# Patient Record
Sex: FEMALE | Race: WHITE | NOT HISPANIC OR LATINO | Employment: FULL TIME | URBAN - METROPOLITAN AREA
[De-identification: names, ages, dates, MRNs, and addresses within clinical notes are randomized per-mention and may not be internally consistent; named-entity substitution may affect disease eponyms.]

---

## 2017-01-18 ENCOUNTER — TRANSFERRED RECORDS (OUTPATIENT)
Dept: HEALTH INFORMATION MANAGEMENT | Facility: CLINIC | Age: 52
End: 2017-01-18

## 2017-01-18 ENCOUNTER — EXTERNAL ORDER RESULTS (OUTPATIENT)
Dept: HEALTH INFORMATION MANAGEMENT | Facility: CLINIC | Age: 52
End: 2017-01-18

## 2017-01-18 LAB
HPV ABSTRACT: NORMAL
PAP SMEAR - HIM PATIENT REPORTED: NEGATIVE

## 2017-01-30 ENCOUNTER — TRANSFERRED RECORDS (OUTPATIENT)
Dept: HEALTH INFORMATION MANAGEMENT | Facility: CLINIC | Age: 52
End: 2017-01-30

## 2017-02-08 ENCOUNTER — TRANSFERRED RECORDS (OUTPATIENT)
Dept: HEALTH INFORMATION MANAGEMENT | Facility: CLINIC | Age: 52
End: 2017-02-08

## 2017-04-18 ENCOUNTER — APPOINTMENT (OUTPATIENT)
Dept: GENERAL RADIOLOGY | Facility: CLINIC | Age: 52
DRG: 287 | End: 2017-04-18
Attending: EMERGENCY MEDICINE
Payer: COMMERCIAL

## 2017-04-18 ENCOUNTER — HOSPITAL ENCOUNTER (INPATIENT)
Facility: CLINIC | Age: 52
LOS: 1 days | Discharge: HOME | DRG: 287 | End: 2017-04-19
Attending: EMERGENCY MEDICINE | Admitting: INTERNAL MEDICINE
Payer: COMMERCIAL

## 2017-04-18 ENCOUNTER — TELEPHONE (OUTPATIENT)
Dept: PEDIATRICS | Facility: CLINIC | Age: 52
End: 2017-04-18

## 2017-04-18 DIAGNOSIS — I20.0 UNSTABLE ANGINA (H): ICD-10-CM

## 2017-04-18 DIAGNOSIS — R07.9 ACUTE CHEST PAIN: ICD-10-CM

## 2017-04-18 PROBLEM — I24.9 ACS (ACUTE CORONARY SYNDROME) (H): Status: ACTIVE | Noted: 2017-04-18

## 2017-04-18 LAB
ALBUMIN SERPL-MCNC: 3.7 G/DL (ref 3.4–5)
ALP SERPL-CCNC: 89 U/L (ref 40–150)
ALT SERPL W P-5'-P-CCNC: 29 U/L (ref 0–50)
ANION GAP SERPL CALCULATED.3IONS-SCNC: 8 MMOL/L (ref 3–14)
AST SERPL W P-5'-P-CCNC: 17 U/L (ref 0–45)
BASOPHILS # BLD AUTO: 0 10E9/L (ref 0–0.2)
BASOPHILS NFR BLD AUTO: 0.5 %
BILIRUB SERPL-MCNC: 0.4 MG/DL (ref 0.2–1.3)
BUN SERPL-MCNC: 11 MG/DL (ref 7–30)
CALCIUM SERPL-MCNC: 8.8 MG/DL (ref 8.5–10.1)
CHLORIDE SERPL-SCNC: 107 MMOL/L (ref 94–109)
CO2 SERPL-SCNC: 24 MMOL/L (ref 20–32)
CREAT SERPL-MCNC: 0.74 MG/DL (ref 0.52–1.04)
D DIMER PPP FEU-MCNC: 0.3 UG/ML FEU (ref 0–0.5)
DIFFERENTIAL METHOD BLD: NORMAL
DIFFERENTIAL METHOD BLD: NORMAL
EOSINOPHIL # BLD AUTO: 0.1 10E9/L (ref 0–0.7)
EOSINOPHIL NFR BLD AUTO: 1.3 %
ERYTHROCYTE [DISTWIDTH] IN BLOOD BY AUTOMATED COUNT: 13.1 % (ref 10–15)
ERYTHROCYTE [DISTWIDTH] IN BLOOD BY AUTOMATED COUNT: NORMAL % (ref 10–15)
GFR SERPL CREATININE-BSD FRML MDRD: 82 ML/MIN/1.7M2
GLUCOSE SERPL-MCNC: 95 MG/DL (ref 70–99)
HCT VFR BLD AUTO: 40.2 % (ref 35–47)
HCT VFR BLD AUTO: NORMAL % (ref 35–47)
HGB BLD-MCNC: 14 G/DL (ref 11.7–15.7)
HGB BLD-MCNC: NORMAL G/DL (ref 11.7–15.7)
IMM GRANULOCYTES # BLD: 0 10E9/L (ref 0–0.4)
IMM GRANULOCYTES NFR BLD: 0.1 %
INTERPRETATION ECG - MUSE: NORMAL
LMWH PPP CHRO-ACNC: 0.6 IU/ML
LYMPHOCYTES # BLD AUTO: 1.7 10E9/L (ref 0.8–5.3)
LYMPHOCYTES NFR BLD AUTO: 22.2 %
MCH RBC QN AUTO: 32 PG (ref 26.5–33)
MCH RBC QN AUTO: NORMAL PG (ref 26.5–33)
MCHC RBC AUTO-ENTMCNC: 34.8 G/DL (ref 31.5–36.5)
MCHC RBC AUTO-ENTMCNC: NORMAL G/DL (ref 31.5–36.5)
MCV RBC AUTO: 92 FL (ref 78–100)
MCV RBC AUTO: NORMAL FL (ref 78–100)
MONOCYTES # BLD AUTO: 0.6 10E9/L (ref 0–1.3)
MONOCYTES NFR BLD AUTO: 8.1 %
NEUTROPHILS # BLD AUTO: 5.2 10E9/L (ref 1.6–8.3)
NEUTROPHILS NFR BLD AUTO: 67.8 %
NRBC # BLD AUTO: 0 10*3/UL
NRBC BLD AUTO-RTO: 0 /100
PLATELET # BLD AUTO: 282 10E9/L (ref 150–450)
PLATELET # BLD AUTO: NORMAL 10E9/L (ref 150–450)
POTASSIUM SERPL-SCNC: 4 MMOL/L (ref 3.4–5.3)
PROT SERPL-MCNC: 8 G/DL (ref 6.8–8.8)
RBC # BLD AUTO: 4.38 10E12/L (ref 3.8–5.2)
RBC # BLD AUTO: NORMAL 10E12/L (ref 3.8–5.2)
SODIUM SERPL-SCNC: 139 MMOL/L (ref 133–144)
TROPONIN I SERPL-MCNC: NORMAL UG/L (ref 0–0.04)
WBC # BLD AUTO: 7.7 10E9/L (ref 4–11)
WBC # BLD AUTO: NORMAL 10E9/L (ref 4–11)

## 2017-04-18 PROCEDURE — 71020 XR CHEST 2 VW: CPT

## 2017-04-18 PROCEDURE — 36415 COLL VENOUS BLD VENIPUNCTURE: CPT | Performed by: PHYSICIAN ASSISTANT

## 2017-04-18 PROCEDURE — 36415 COLL VENOUS BLD VENIPUNCTURE: CPT | Performed by: EMERGENCY MEDICINE

## 2017-04-18 PROCEDURE — 84484 ASSAY OF TROPONIN QUANT: CPT | Performed by: PHYSICIAN ASSISTANT

## 2017-04-18 PROCEDURE — 93005 ELECTROCARDIOGRAM TRACING: CPT

## 2017-04-18 PROCEDURE — 25000132 ZZH RX MED GY IP 250 OP 250 PS 637: Performed by: PHYSICIAN ASSISTANT

## 2017-04-18 PROCEDURE — 99221 1ST HOSP IP/OBS SF/LOW 40: CPT | Performed by: INTERNAL MEDICINE

## 2017-04-18 PROCEDURE — 25000128 H RX IP 250 OP 636: Performed by: EMERGENCY MEDICINE

## 2017-04-18 PROCEDURE — 99223 1ST HOSP IP/OBS HIGH 75: CPT | Mod: 25 | Performed by: INTERNAL MEDICINE

## 2017-04-18 PROCEDURE — 36415 COLL VENOUS BLD VENIPUNCTURE: CPT

## 2017-04-18 PROCEDURE — 99207 ZZC CDG-EXAM COMPONENT: MEETS EXP PROBLEM FOCUSED - DOWN CODED: CPT | Performed by: INTERNAL MEDICINE

## 2017-04-18 PROCEDURE — 96365 THER/PROPH/DIAG IV INF INIT: CPT

## 2017-04-18 PROCEDURE — 85379 FIBRIN DEGRADATION QUANT: CPT | Performed by: EMERGENCY MEDICINE

## 2017-04-18 PROCEDURE — 25000132 ZZH RX MED GY IP 250 OP 250 PS 637: Performed by: EMERGENCY MEDICINE

## 2017-04-18 PROCEDURE — 25000132 ZZH RX MED GY IP 250 OP 250 PS 637

## 2017-04-18 PROCEDURE — 85520 HEPARIN ASSAY: CPT

## 2017-04-18 PROCEDURE — 85025 COMPLETE CBC W/AUTO DIFF WBC: CPT | Performed by: EMERGENCY MEDICINE

## 2017-04-18 PROCEDURE — 21000001 ZZH R&B HEART CARE

## 2017-04-18 PROCEDURE — 99207 ZZC APP CREDIT; MD BILLING SHARED VISIT: CPT | Performed by: PHYSICIAN ASSISTANT

## 2017-04-18 PROCEDURE — 99285 EMERGENCY DEPT VISIT HI MDM: CPT | Mod: 25

## 2017-04-18 RX ORDER — ONDANSETRON 4 MG/1
4 TABLET, ORALLY DISINTEGRATING ORAL EVERY 6 HOURS PRN
Status: DISCONTINUED | OUTPATIENT
Start: 2017-04-18 | End: 2017-04-19 | Stop reason: HOSPADM

## 2017-04-18 RX ORDER — ONDANSETRON 2 MG/ML
4 INJECTION INTRAMUSCULAR; INTRAVENOUS EVERY 6 HOURS PRN
Status: DISCONTINUED | OUTPATIENT
Start: 2017-04-18 | End: 2017-04-19 | Stop reason: HOSPADM

## 2017-04-18 RX ORDER — NITROGLYCERIN 80 MG/1
1 PATCH TRANSDERMAL ONCE
Status: COMPLETED | OUTPATIENT
Start: 2017-04-18 | End: 2017-04-18

## 2017-04-18 RX ORDER — SIMVASTATIN 20 MG
20 TABLET ORAL EVERY EVENING
Status: DISCONTINUED | OUTPATIENT
Start: 2017-04-18 | End: 2017-04-19

## 2017-04-18 RX ORDER — ACETAMINOPHEN 325 MG/1
650 TABLET ORAL EVERY 4 HOURS PRN
Status: DISCONTINUED | OUTPATIENT
Start: 2017-04-18 | End: 2017-04-19

## 2017-04-18 RX ORDER — HEPARIN SODIUM 1000 [USP'U]/ML
6000 INJECTION, SOLUTION INTRAVENOUS; SUBCUTANEOUS ONCE
Status: COMPLETED | OUTPATIENT
Start: 2017-04-18 | End: 2017-04-18

## 2017-04-18 RX ORDER — LIDOCAINE 40 MG/G
CREAM TOPICAL
Status: DISCONTINUED | OUTPATIENT
Start: 2017-04-18 | End: 2017-04-19

## 2017-04-18 RX ORDER — MORPHINE SULFATE 2 MG/ML
1 INJECTION, SOLUTION INTRAMUSCULAR; INTRAVENOUS
Status: DISCONTINUED | OUTPATIENT
Start: 2017-04-18 | End: 2017-04-19 | Stop reason: HOSPADM

## 2017-04-18 RX ORDER — ASPIRIN 325 MG
325 TABLET ORAL DAILY
Status: DISCONTINUED | OUTPATIENT
Start: 2017-04-19 | End: 2017-04-19

## 2017-04-18 RX ORDER — ALUMINA, MAGNESIA, AND SIMETHICONE 2400; 2400; 240 MG/30ML; MG/30ML; MG/30ML
15-30 SUSPENSION ORAL EVERY 4 HOURS PRN
Status: DISCONTINUED | OUTPATIENT
Start: 2017-04-18 | End: 2017-04-19 | Stop reason: HOSPADM

## 2017-04-18 RX ORDER — LIDOCAINE 40 MG/G
CREAM TOPICAL
Status: CANCELLED | OUTPATIENT
Start: 2017-04-18

## 2017-04-18 RX ORDER — NITROGLYCERIN 0.4 MG/1
0.4 TABLET SUBLINGUAL EVERY 5 MIN PRN
Status: DISCONTINUED | OUTPATIENT
Start: 2017-04-18 | End: 2017-04-18

## 2017-04-18 RX ORDER — NITROGLYCERIN 0.4 MG/1
TABLET SUBLINGUAL
Status: COMPLETED
Start: 2017-04-18 | End: 2017-04-18

## 2017-04-18 RX ORDER — NITROGLYCERIN 0.4 MG/1
0.4 TABLET SUBLINGUAL EVERY 5 MIN PRN
Status: DISCONTINUED | OUTPATIENT
Start: 2017-04-18 | End: 2017-04-19 | Stop reason: HOSPADM

## 2017-04-18 RX ORDER — PROCHLORPERAZINE MALEATE 5 MG
5-10 TABLET ORAL EVERY 6 HOURS PRN
Status: DISCONTINUED | OUTPATIENT
Start: 2017-04-18 | End: 2017-04-19 | Stop reason: HOSPADM

## 2017-04-18 RX ORDER — PROCHLORPERAZINE 25 MG
25 SUPPOSITORY, RECTAL RECTAL EVERY 12 HOURS PRN
Status: DISCONTINUED | OUTPATIENT
Start: 2017-04-18 | End: 2017-04-19 | Stop reason: HOSPADM

## 2017-04-18 RX ORDER — LIDOCAINE 40 MG/G
CREAM TOPICAL
Status: DISCONTINUED | OUTPATIENT
Start: 2017-04-18 | End: 2017-04-18

## 2017-04-18 RX ORDER — ACETAMINOPHEN 650 MG/1
650 SUPPOSITORY RECTAL EVERY 4 HOURS PRN
Status: DISCONTINUED | OUTPATIENT
Start: 2017-04-18 | End: 2017-04-19 | Stop reason: HOSPADM

## 2017-04-18 RX ORDER — NALOXONE HYDROCHLORIDE 0.4 MG/ML
.1-.4 INJECTION, SOLUTION INTRAMUSCULAR; INTRAVENOUS; SUBCUTANEOUS
Status: DISCONTINUED | OUTPATIENT
Start: 2017-04-18 | End: 2017-04-19

## 2017-04-18 RX ADMIN — METOPROLOL TARTRATE 12.5 MG: 25 TABLET, FILM COATED ORAL at 20:24

## 2017-04-18 RX ADMIN — METOPROLOL TARTRATE 12.5 MG: 25 TABLET, FILM COATED ORAL at 15:01

## 2017-04-18 RX ADMIN — Medication 6000 UNITS: at 11:46

## 2017-04-18 RX ADMIN — NITROGLYCERIN 1 PATCH: 0.4 PATCH TRANSDERMAL at 11:58

## 2017-04-18 RX ADMIN — SIMVASTATIN 20 MG: 20 TABLET, FILM COATED ORAL at 20:24

## 2017-04-18 RX ADMIN — NITROGLYCERIN 0.4 MG: 0.4 TABLET SUBLINGUAL at 10:44

## 2017-04-18 RX ADMIN — ACETAMINOPHEN 650 MG: 325 TABLET, FILM COATED ORAL at 15:01

## 2017-04-18 RX ADMIN — HEPARIN SODIUM 1200 UNITS/HR: 10000 INJECTION, SOLUTION INTRAVENOUS at 11:46

## 2017-04-18 RX ADMIN — NITROGLYCERIN 0.4 MG: 0.4 TABLET SUBLINGUAL at 10:49

## 2017-04-18 ASSESSMENT — ENCOUNTER SYMPTOMS
VOMITING: 0
DIAPHORESIS: 0
NAUSEA: 0
APPETITE CHANGE: 0
SHORTNESS OF BREATH: 0
FEVER: 0
LIGHT-HEADEDNESS: 1
COUGH: 0
NUMBNESS: 1

## 2017-04-18 ASSESSMENT — ACTIVITIES OF DAILY LIVING (ADL)
FALL_HISTORY_WITHIN_LAST_SIX_MONTHS: NO
RETIRED_EATING: 0-->INDEPENDENT
COGNITION: 0 - NO COGNITION ISSUES REPORTED
TOILETING: 0-->INDEPENDENT
DRESS: 0-->INDEPENDENT
AMBULATION: 0-->INDEPENDENT
SWALLOWING: 0-->SWALLOWS FOODS/LIQUIDS WITHOUT DIFFICULTY
RETIRED_COMMUNICATION: 0-->UNDERSTANDS/COMMUNICATES WITHOUT DIFFICULTY
BATHING: 0-->INDEPENDENT
TRANSFERRING: 0-->INDEPENDENT

## 2017-04-18 NOTE — H&P
PRIMARY CARE PROVIDER:  Claudette Estrada MD      CHIEF COMPLAINT:  Chest pain.  History obtained from patient.      DATE OF VISIT:  2017      HISTORY OF PRESENT ILLNESS:  Dalila Roman is a 51-year-old female with past medical history of morbid obesity who presented to the Emergency Department today with complaints of chest pain.  She was in her normal state of health this morning when she was getting in the shower and she developed substernal chest pain that she describes as a heaviness or as though something was sitting on her chest.  She was able to proceed with her morning and went to work.  Her chest pain started radiating to her left arm and eventually to her jaw.  When it radiated to her jaw, she elected to be evaluated by the school nurse.  She was given aspirin and EMS was summoned.  She was subsequently given 2 nitroglycerin by EMS and transferred to [facility].      In the Emergency Department, she was evaluated by Dr. Patrick.  Vitals, she was hypertensive on arrival but remainder were unremarkable.  Laboratory evaluation was unremarkable.  EKG without nonischemic.  She was given 2 additional nitroglycerin and initiated on nitroglycerin patch as well as heparin drip due to concern for unstable angina.      Presently patient is evaluated in her hospital room.  She offers no complaints at this time.  Chest pain has currently resolved.  No previous history of chest pain.  She does have a history of morbid obesity, but no other cardiovascular risk factors.  She does have substantial family history of coronary artery disease.  Her father  of a fatal MI in his 40s and both brothers were diagnosed with coronary artery disease in their 40s as well.      SOCIAL HISTORY:  She is fairly active.  She goes to the gym 3 times per week and denies any recent issues with chest pain, dyspnea on exertion or decreased exercise tolerance.      PAST MEDICAL HISTORY:   1.  Morbid obesity, BMI  43.4.   2.  Seasonal allergies.      PRIOR TO ADMISSION MEDICATIONS:  Zyrtec p.r.n.      ALLERGIES:  Codeine.      SURGICAL HISTORY:   x4.      FAMILY HISTORY:  Father  of a fatal MI at the age of 42.  He did have comorbid conditions including obesity and tobacco dependence.  Two brothers have also been diagnosed with coronary artery disease in their 40s.      SOCIAL HISTORY:  She is a lifelong nonsmoker, drinks alcohol socially, is .      REVIEW OF SYSTEMS:  A 10-point review of systems was completed.  Pertinent positives noted in HPI, all other systems negative.      PHYSICAL EXAMINATION:   GENERAL:  Dalila Roman is an extremely pleasant, well-developed, well-nourished 51-year-old female who is lying in bed in no acute distress.   VITAL SIGNS:  Blood pressure is 123/68, heart rate 62, respiration 20, O2 sat 99% on room air.   HEENT:  Normocephalic and atraumatic.   CARDIOVASCULAR:  Regular rate and rhythm, no murmurs.   PULMONARY:  Normal effort.  Clear to auscultation bilaterally.   ABDOMEN:  Normal bowel sounds.  Abdomen is soft, nontender.   EXTREMITIES:  Moves all 4 extremities.  Dorsalis pedis radial pulses are palpated bilaterally.   NEUROLOGIC:  Alert and oriented.  Cranial nerves II-XII are grossly intact.      EKG:  Personally reviewed EKG agree with no ischemic changes.      IMAGING:  Personally reviewed chest x-ray and agree with no acute cardiopulmonary disease.      ASSESSMENT:  Dalila Roman is a 51-year-old female with past medical history of morbid obesity and family history of premature coronary artery disease who presented to the Emergency Department with complaints of chest pain and is currently being admitted for further evaluation.   1.  Chest pain with concerns of unstable angina.  Workup in the Emergency Department was essentially negative; however, she does have a very convincing story.  She has been initiated on heparin drip and nitroglycerin patch in the Emergency  Department and will continue.  We will also start low dose beta blocker and statin as well as daily aspirin.  Will trend troponins and monitoring on continuous telemetry.  We will consult Cardiology for further recommendations.  Fasting lipid profile will be obtained in the morning.  We will defer further cardiac imaging or workup to Cardiology's recommendations.   2.  Morbid obesity, BMI 43.4.   3.  Deep venous thrombosis prophylaxis.  Heparin drip.   4.  Code status:  Full code.      The patient was discussed with Dr. Sofia Nance of the Hospitalist Service who independently interviewed and examined the patient.  He is in agreement with the above plan.         SOFIA NANCE MD       As dictated by MARIBEL SOTO PA-C            D: 2017 12:39   T: 2017 13:20   MT: GREGORY      Name:     JUDITH RASCON   MRN:      [id number]        Account:      [id number]   :      1965           Admitted:     [id number]      Document: [id number]       cc: Claudette Estrada MD

## 2017-04-18 NOTE — IP AVS SNAPSHOT
MRN:[id number]                      After Visit Summary   4/18/2017    Dalila Roman    MRN: [id number]           Thank you!     Thank you for choosing [facility] for your care. Our goal is always to provide you with excellent care. Hearing back from our patients is one way we can continue to improve our services. Please take a few minutes to complete the written survey that you may receive in the mail after you visit with us. Thank you!        Patient Information     Date Of Birth          1965        Designated Caregiver       Most Recent Value    Caregiver    Will someone help with your care after discharge? no      About your hospital stay     You were admitted on:  April 18, 2017 You last received care in the:  [facility]    You were discharged on:  April 19, 2017        Reason for your hospital stay       You were admitted with chest pain. Chest x-ray was normal. A d-dimer test that looks for blood clots was negative. We initially treated you like a heart attack but luckily the angiogram showed your heart arteries were all open and functioning well so this pain is not thought to be from your heart.  It is most likely some reflux or muscular pain.  If it recurs you should follow up with your primary care physician as needed to continue to help you. It was a pleasure for our Hospitalist group and me personally to care for you at [facility].  We wish you a speedy recovery and good health!                  Who to Call     For medical emergencies, please call [phone].  For non-urgent questions about your medical care, please call your primary care provider or clinic, [phone]          Attending Provider     Provider Specialty    Zac Patrick MD Emergency Medicine    Luis Wolff MD Internal Medicine       Primary Care Provider Office Phone # Fax #    Claudette Estrada [phone]       [facility]  [address] [place] [address]        After Care Instructions     Activity       Your activity upon discharge: activity as tolerated            Diet       Follow this diet upon discharge: Regular diet                  Follow-up Appointments     Follow-up and recommended labs and tests        Follow up with primary care provider, Claudette Estrada, as needed if your pain recurs.                  Further instructions from your care team       Going Home after ANGIOGRAM      Name: Dalila Roman  Medical Record Number:  [id number]  Today's Date: April 19, 2017        For 24 hours:         Have an adult stay with you for 24 hours.         Relax and take it easy.         Drink plenty of fluids.         You may eat your normal diet, unless your doctor tells you otherwise.         Do NOT make any important or legal decisions.         Do NOT drive or operate machines at home or at work.         Do NOT drink alcohol.      Do NOT smoke.     Medicines:         If you have begun Plavix (clopidogrel), Effient (prasugrel), or Brilinta (ticagrelor), do not stop taking it until you talk to your heart doctor (cardiologist).         If you are on metformin (Glucophage), do not restart it until you have blood tests (within 2 to 3 days after discharge). When your doctor tells you it is safe, you may restart the metformin.         If you have stopped any other medicines, check with your nurse or provider about when to restart them.    Care of groin site:         Remove the Band-Aid after 24 hours. If there is minor oozing, apply another Band-aid and remove it after 12 hours.          Do NOT take a bath, or use a hot tub or pool for at least 3 days. You may shower.          It is normal to have a small bruise or lump at the site.         Do not scrub the site.         Do not use lotion or powder near the puncture site for 3 days.         For the first 2 days: Do not stoop or squat. When you cough, sneeze or move your  "bowels, hold your hand over the puncture site and press gently.         Do not lift more than 10 pounds for at least 3 to 5 days.         For 2 days, do NOT have sex or do any heavy exercise.     If you start bleeding from the site in your groin:  Lie down flat and press firmly on the site.  Call your physician immediately, or, come to the emergency room.      Call 911 right away if you have bleeding that is heavy or does not stop.     Call your doctor if:         You have a large or growing hard lump around the site.         The site is red, swollen, hot or tender.         Blood or fluid is draining from the site.         You have chills or a fever greater than 101 F (38 C).         Your leg or arm turns bluish, feels numb or cool.         You have hives, a rash or unusual itching.       ADDITIONAL INSTRUCTIONS:    [facility] [place]:   [phone] (7 days a week)      Cardiology Fellow on call (24 hours per day) at [facility]:   [phone] (ask for Cardiology Fellow on call)      Number where we can reach you:  ____________    Pending Results     No orders found for last 3 day(s).            Statement of Approval     Ordered          04/19/17 6095  I have reviewed and agree with all the recommendations and orders detailed in this document.  EFFECTIVE NOW     Approved and electronically signed by:  Luis Wolff MD             Admission Information     Date & Time Provider Department Dept. Phone    4/18/2017 Luis Wolff MD [facility] [phone]      Your Vitals Were     Blood Pressure Pulse Temperature Respirations Height Weight    155/97 (BP Location: Left arm) 79 98.6  F (37  C) (Oral) 16 1.549 m (5' 1\") 106.5 kg (234 lb 12.6 oz)    Pulse Oximetry BMI (Body Mass Index)                97% 44.36 kg/m2          MyChart Information     [facility] gives you secure access to your electronic health record. If you see a primary care " provider, you can also send messages to your care team and make appointments. If you have questions, please call your primary care clinic.  If you do not have a primary care provider, please call [phone] and they will assist you.        Care EveryWhere ID     This is your Care EveryWhere ID. This could be used by other organizations to access your [facility] medical records  [id number]           Review of your medicines      CONTINUE these medicines which have NOT CHANGED        Dose / Directions    cetirizine 10 MG tablet   Commonly known as:  zyrTEC   Used for:  Environmental allergies, Seasonal allergic rhinitis        Dose:  10 mg   Take 1 tablet (10 mg) by mouth daily   Quantity:  90 tablet   Refills:  3                Protect others around you: Learn how to safely use, store and throw away your medicines at www.disposemymeds.org.             Medication List: This is a list of all your medications and when to take them. Check marks below indicate your daily home schedule. Keep this list as a reference.      Medications           Morning Afternoon Evening Bedtime As Needed    cetirizine 10 MG tablet   Commonly known as:  zyrTEC   Take 1 tablet (10 mg) by mouth daily   Next Dose Due:  4-20-17 AM

## 2017-04-18 NOTE — TELEPHONE ENCOUNTER
Patient calling asking if she walked in could an EKG be done. Patient states she has had chest pain in the left chest area for about 2 hours. Denies SOB or sweating. States she does have some tingling going into the left arm area. Advised that she should go to the ER rather than the clinic as they can do troponin levels and we can't. She will do this.   Alessandra Johansen RN

## 2017-04-18 NOTE — CONSULTS
CARDIOLOGY CONSULTATION      REFERRING SERVICE:  Hospitalist Service.      INDICATION FOR CARDIAC CONSULTATION:  Acute coronary syndrome.      HISTORY OF PRESENT ILLNESS:  It is my pleasure to see your patient, Dalila Roman, who is a very pleasant 51-year-old female patient with no past cardiac history, but who has a very strong family history.  Her father  of a myocardial infarct in his early 40s and her brothers all have coronary artery disease in their 40s.  The patient was getting into the shower today when she developed left-sided chest pressure like somebody was sitting on her chest.  The discomfort radiated down her left arm.  She continued with her showering and went to work, but the discomfort continued.  It lasted in total for approximately an hour and a half to 2 hours.  She did take sublingual nitroglycerin, and eventually, the sublingual nitroglycerin and nitropatch relieved the chest discomfort.  Her first troponin is normal, being less than 0.015.  The 12-lead electrocardiogram which was performed when the patient was pain-free did not show acute ischemic changes.  EKG essentially was normal with a ventricular rate of 64 beats per minute.      The patient's risk factor for coronary artery disease is strong family history.  She is not diabetic.  She smoked many years ago and only lightly.  She does not have a history of hypertension.  Her cholesterol in the past was noted to be normal.      PAST MEDICAL HISTORY:  The patient is morbidly obese with a BMI of 43.46 and she has seasonal allergies.  She had 4 C-sections.      FAMILY HISTORY:  Strongly positive for coronary artery disease.      SOCIAL HISTORY:  Many years ago she smoked and only very lightly.  She drinks alcohol occasionally.  She is  and her  is here with her.      ALLERGIES:  Codeine which causes hallucinations.      MEDICATIONS IN HOSPITAL:   1.  Aspirin 325 mg per day.   2.  Intravenous heparin bolus plus infusion.   3.   Metoprolol tartrate 12.5 mg twice day.   4.  Nitropatch.   5.  Simvastatin 20 mg every evening.      REVIEW OF SYSTEMS:   CONSTITUTIONAL:  Negative.   EYES:  Negative.   ENT:  Negative.   CARDIOVASCULAR:  As above.   RESPIRATORY:  Nil.   GASTROINTESTINAL:  Nil.   GENITOURINARY:  Nil.   MUSCULOSKELETAL:  Nil.   NEUROLOGIC:  Nil.   PSYCHIATRIC:  Nil.   ENDOCRINE:  Nil.   HEMATOLYMPHATIC:  Nil.   ALLERGY/IMMUNOLOGY:  As above.      PHYSICAL EXAMINATION:   GENERAL:  She is a very pleasant patient in no apparent distress.   VITAL SIGNS:  Her blood pressure is 141/74.  Her pulse is 77 beats per minute, sinus rhythm on the monitor.  She is afebrile at 98.1.  She is satting at 97% and her respiratory rate is 18.   HEAD, EYES, EARS, NECK, NOSE AND THROAT:  Reveal that she has normal facial symmetry.  Her pupils are equal and reacting to light.  Her carotids are normal with no bruits.  Her jugular venous pulse is not raised.   CARDIOVASCULAR:  Heart sounds 1 and 2 are normal with no murmurs and no extra heart sounds.   CHEST:  Clear to percussion and auscultation with no added sounds.   ABDOMEN:  Unremarkable with no hepatosplenomegaly, no masses or bruits.   EXTREMITIES:  Femoral pulses are 1+, dorsalis pedis and posterior tibials are 2+.  She has no peripheral edema.   DERMATOLOGIC:  No skin lesions were noted.   NEUROLOGIC:  She moves all 4 limbs appropriately with no obvious sensory or motor loss and she is fully oriented to time, place and person with a normal affect.      LABORATORY INVESTIGATIONS:  Sodium is 139, potassium is 4.0, BUN is 11, creatinine is 0.74.  Liver function tests are normal.  First troponin is less than 0.015.  Random glucose is 95.  Her white cell count is 7.7, hemoglobin is 14.0 and platelet count is 282,000.  D-dimer is 0.3.      Chest x-ray was normal.      IMPRESSION:  The chest discomfort is very suggestive for angina pectoris with a pressure type sensation radiating down the left arm.  This  is also in a patient who has a very strong family history for coronary artery disease and who is morbidly obese.      PLAN:  I feel that the best way to determine the cause of this patient's chest discomfort is perform a coronary angiogram.  I explained the risks and benefits of the procedure to the patient including the risk of stroke, heart attack, death, bleeding, bruising, hematoma formation, vascular damage, dye allergy, dye nephropathy, the risks associated with conscious sedation including aspiration, intubation and the risks associated with blood transfusions.  I also explained the special risks associated with coronary interventions including increased risk of death, myocardial infarction and emergency bypass surgery.  The patient told me she understood why we are doing the procedure, she told me she understood the risks associated with the procedure, and finally, she told me that she wished to proceed.  We will continue her good present medical therapy and we will complete the troponin series.      It has been my pleasure to be involved in the care of this very nice patient.         CATHY MCKNIGHT MD, [facility]             D: 2017 13:56   T: 2017 14:38   MT: TS      Name:     JUDITH RASCON   MRN:      [id number]        Account:       [id number]   :      1965           Consult Date:  2017      Document: [id number]

## 2017-04-18 NOTE — PLAN OF CARE
Problem: Goal Outcome Summary  Goal: Goal Outcome Summary  Outcome: No Change  A&Ox4. 98% on RA. Tele SR HR, 70s. Pt complained of headache, treated with Tylenol with relief. Heparin running at 1200 units/hr. Plan for possible angio tomorrow. Continue to monitor.

## 2017-04-18 NOTE — ED PROVIDER NOTES
History     Chief Complaint:  Chest pain     HPI   Dalila Roman is a 51 year old otherwise healthy female who presents to the emergency department today via EMS for evaluation of chest pain. The patient reports while getting ready for this morning she had a gradual onset chest pain with associated left arm tingling and jaw pain. She went to work and developed a sharp pain up her arm when typing as well as starting to feel lightheaded. The patient works in a school district, and at this time she presented to the school nurse who gave her four baby aspirins and subsequently called EMS. In route, the patient was given two nitroglycerin's which helped her pain but it has radiated to her upper chest. She describes her discomfort as if someone is sitting on her chest and has never had pain like this before. Additionally, the patient exercises, but normally has no discomfort. The patient denies appetite change, shortness of breath, diaphoresis, leg swelling or soreness, nausea, and vomiting.     Cardiac/PE/DVT Risk Factors:  History of hypertension - Negative  History of hyperlipidemia - Negative  History of diabetes - Negative  History of smoking - Positive  Personal history of PE/DVT - Negative  Family history of PE/DVT - Negative  Family history of heart complications - Positive  Exercise - Positive  Recent travel - Negative  Recent surgery - Negative  Other immobilizations - Negative  Cancer - Negative    Allergies:  Codeine sulfate     Medications:    Zyrtec    Past Medical History:    Environmental allergies     Past Surgical History:     x2  Billings teeth extraction  Orthopedic surgery, right foot  Carpel tunnel surgery    Family History:    Diabetes  Breast cancer  Hypertension  MI  Heart Disease  Colorectal cancer  Thyroid disease    Social History:  The patient was accompanied to the ED by her family.  Smoking Status: Former  Smokeless Tobacco: Never  Alcohol Use: Occasionally   Marital Status:   "      Review of Systems   Constitutional: Negative for appetite change, diaphoresis and fever.   Respiratory: Negative for cough and shortness of breath.    Cardiovascular: Positive for chest pain. Negative for leg swelling.   Gastrointestinal: Negative for nausea and vomiting.   Musculoskeletal:        Jaw pain and superior head pain   Neurological: Positive for light-headedness and numbness (left arm).   All other systems reviewed and are negative.    Physical Exam   First Vitals:  BP (!) 140/102  Temp 98.1  F (36.7  C) (Oral)  Ht 1.549 m (5' 1\")  Wt 104.3 kg (230 lb)  SpO2 99%  BMI 43.46 kg/m2       Physical Exam  Constitutional: Heave set white female supine. No respiratory distress.   HENT: No signs of trauma.   Eyes: EOM are normal. Pupils are equal, round, and reactive to light.   Neck: Normal range of motion. No JVD present. No cervical adenopathy.  Cardiovascular: Regular rhythm.  Exam reveals no gallop and no friction rub.    No murmur heard. 2+ radial and femoral pulses.  Pulmonary/Chest: Bilateral breath sounds normal. No wheezes, rhonchi or rales.  Abdominal: Soft. No tenderness. No rebound or guarding.   Musculoskeletal: No edema. No tenderness.   Lymphadenopathy: No lymphadenopathy.   Neurological: Alert and oriented to person, place, and time. Normal strength. Coordination normal.   Skin: Skin is warm and dry. No rash noted. No erythema.     Emergency Department Course     ECG:  ECG taken at 1029  Normal sinus rhythm  Normal ECG  Rate 64 bpm. CA interval 172. QRS duration 86. QT/QTc 454/468. P-R-T axes 13 10 12.    Imaging:  Radiology findings were communicated with the patient who voiced understanding of the findings.  XR Chest 2 Views  IMPRESSION:  Negative.   Report per radiology     Laboratory:  Laboratory findings were communicated with the patient who voiced understanding of the findings.  D Dimer: 0.3  CBC: WBC 7.7, HGB 14.0,   Troponin: <0.015  CMP: AWNL (Creatinine " 0.74)    Interventions:  1044 Nitroglycerin 0.4mg Sublingual   1049 Nitroglycerin 0.4mg Sublingual   1146 Heparin infusion 1,200 units/hr IV  1146 Heparin injection 6,000 units IV  1158 Nitroglycerin 1 Patch Transdermal      Emergency Department Course:  Nursing notes and vitals reviewed.  The patient was sent for a XR Chest 2 Views while in the emergency department, results above.   IV was inserted and blood was drawn for laboratory testing, results above.  1030: I performed an exam of the patient as documented above.   1119: Patient's pain improved from a 6 to a 0 with the two nitroglycerins.   1144: Patient rechecked and updated.   1149:  I spoke with Dr. Wolff of the hospitalist service regarding patient's presentation, findings, and plan of care.  I discussed the treatment plan with the patient. They expressed understanding of this plan and consented to admission. I discussed the patient with Dr. Wolff, who will admit the patient to a monitored bed for further evaluation and treatment.  I personally reviewed the laboratory results with the Patient and answered all related questions prior to admission.     Impression & Plan      Medical Decision Making:  Dalila Roman is a 51 year old female who presents to the emergency department with chest discomfort. Patient was getting ready for her work and she noted tightness in her chest going into her jaw. She went to work and fell somewhat lightheaded. She went to the nurses' office who gave her aspirin and called an ambulance. She received two nitro in route and her pain is much better, but not completely gone. Patient is heavy set, she is a remote smoker. She has no diabetes or cholesterol problems. She does exercise three times a week, but she also had a father who  at 42 from heart disease. Her examination is non-focal. Her chest x-ray, troponin, d-dimer, and EKG are unremarkable. She received two additional nitro and pain resolved. I placed her on nitro  patch and started her on heparin, both bolus and drip with a concern being unstable angina. I think this is unlikely to be PE or dissection. Patient discussed with Dr. Wolff and will be admitted to [facility] for further evaluation and treatment.    Diagnosis:    ICD-10-CM    1. Acute chest pain R07.9    2. Unstable angina (H) I20.0      Disposition:   Admitted to [facility] under the supervision of Dr. Wolff    Scribe Disclosure:  I, Lolis Lang, am serving as a scribe at 10:30 AM on 4/18/2017 to document services personally performed by Zac Patrick MD, based on my observations and the provider's statements to me.    4/18/2017    EMERGENCY DEPARTMENT       Zac Patrick MD  04/18/17 3554

## 2017-04-18 NOTE — ED NOTES
"[facility]  ED Nurse Handoff Report    ED Chief complaint: Chest Pain (started this am at home, pain radiates into the jaw and down the left arm. )      ED Diagnosis:   Final diagnoses:   Acute chest pain       Code Status: Full Code    Allergies:   Allergies   Allergen Reactions     Codeine Sulfate Other (See Comments)     Hallucinations       Activity level - Baseline/Home:  Independent    Activity Level - Current:   Independent     Needed?: No    Isolation: No  Infection: Not Applicable    Bariatric?: No    Vital Signs:   Vitals:    04/18/17 1033 04/18/17 1040   BP: (!) 164/95 (!) 140/102   Temp:  98.1  F (36.7  C)   TempSrc:  Oral   SpO2:  99%   Weight:  104.3 kg (230 lb)   Height:  1.549 m (5' 1\")       Cardiac Rhythm: ,   Cardiac  Cardiac Rhythm: Sinus bradycardia    Pain level: 0-10 Pain Scale: 0    Is this patient confused?: No    Patient Report: Initial Complaint: This morning pt was getting ready for work and developed chest pressure that radiated into the jaw and down the left arm. At work she developed a sharp pain up her arm when typing. School nurse who gave her 4 baby ASA and called EMS.   Focused Assessment: Pt is pain free after given nitro x 2 in the ED.   Tests Performed: Chest xray, EKG, Labs  Abnormal Results: Trop WNL  Treatments provided: Heparin started. Nitro patch on right arm.    Family Comments: Spouse at bedside.     OBS brochure/video discussed/provided to patient: No    ED Medications:   Medications   lidocaine 1 % 1 mL (not administered)   lidocaine (LMX4) cream (not administered)   sodium chloride (PF) 0.9% PF flush 3 mL (not administered)   sodium chloride (PF) 0.9% PF flush 3 mL (not administered)   nitroglycerin (NITROSTAT) sublingual tablet 0.4 mg (0.4 mg Sublingual Given 4/18/17 1049)   heparin infusion 25,000 units in 0.45% NaCl 250 mL (1,200 Units/hr Intravenous New Bag 4/18/17 1146)   heparin (porcine) injection 6,000 Units (6,000 Units Intravenous " Given 4/18/17 1146)   nitroglycerin (NITRODUR) 0.4 MG/HR 24 hr patch 1 patch (1 patch Transdermal Given 4/18/17 1158)       Drips infusing?:  Yes      ED NURSE PHONE NUMBER: [phone]

## 2017-04-18 NOTE — IP AVS SNAPSHOT
[facility]    [address] [place] [address]., [address]    [place] [address]    Phone:  [phone]                                       After Visit Summary   4/18/2017    Dalila Roman    MRN: [id number]           After Visit Summary Signature Page     I have received my discharge instructions, and my questions have been answered. I have discussed any challenges I see with this plan with the nurse or doctor.    ..........................................................................................................................................  Patient/Patient Representative Signature      ..........................................................................................................................................  Patient Representative Print Name and Relationship to Patient    ..................................................               ................................................  Date                                            Time    ..........................................................................................................................................  Reviewed by Signature/Title    ...................................................              ..............................................  Date                                                            Time

## 2017-04-19 ENCOUNTER — APPOINTMENT (OUTPATIENT)
Dept: CARDIOLOGY | Facility: CLINIC | Age: 52
DRG: 287 | End: 2017-04-19
Attending: INTERNAL MEDICINE
Payer: COMMERCIAL

## 2017-04-19 VITALS
OXYGEN SATURATION: 97 % | TEMPERATURE: 98.6 F | HEIGHT: 61 IN | HEART RATE: 79 BPM | RESPIRATION RATE: 16 BRPM | WEIGHT: 234.79 LBS | BODY MASS INDEX: 44.33 KG/M2 | DIASTOLIC BLOOD PRESSURE: 97 MMHG | SYSTOLIC BLOOD PRESSURE: 155 MMHG

## 2017-04-19 LAB
CHOLEST SERPL-MCNC: 187 MG/DL
HCG UR QL: NEGATIVE
HDLC SERPL-MCNC: 70 MG/DL
KCT BLD-ACNC: 138 SEC (ref 105–167)
LDLC SERPL CALC-MCNC: 90 MG/DL
LMWH PPP CHRO-ACNC: 0.26 IU/ML
LMWH PPP CHRO-ACNC: 0.28 IU/ML
NONHDLC SERPL-MCNC: 117 MG/DL
TRIGL SERPL-MCNC: 136 MG/DL

## 2017-04-19 PROCEDURE — 99238 HOSP IP/OBS DSCHRG MGMT 30/<: CPT | Performed by: INTERNAL MEDICINE

## 2017-04-19 PROCEDURE — 25000132 ZZH RX MED GY IP 250 OP 250 PS 637: Performed by: PHYSICIAN ASSISTANT

## 2017-04-19 PROCEDURE — 93458 L HRT ARTERY/VENTRICLE ANGIO: CPT | Mod: 26 | Performed by: INTERNAL MEDICINE

## 2017-04-19 PROCEDURE — 25000128 H RX IP 250 OP 636

## 2017-04-19 PROCEDURE — 85520 HEPARIN ASSAY: CPT | Performed by: INTERNAL MEDICINE

## 2017-04-19 PROCEDURE — 27210910 ZZH NEEDLE CR6

## 2017-04-19 PROCEDURE — B2111ZZ FLUOROSCOPY OF MULTIPLE CORONARY ARTERIES USING LOW OSMOLAR CONTRAST: ICD-10-PCS | Performed by: INTERNAL MEDICINE

## 2017-04-19 PROCEDURE — 27211089 ZZH KIT ACIST INJECTOR CR3

## 2017-04-19 PROCEDURE — 25000125 ZZHC RX 250: Performed by: INTERNAL MEDICINE

## 2017-04-19 PROCEDURE — 27210795 ZZH PAD DEFIB QUICK CR4

## 2017-04-19 PROCEDURE — 81025 URINE PREGNANCY TEST: CPT | Performed by: INTERNAL MEDICINE

## 2017-04-19 PROCEDURE — 99152 MOD SED SAME PHYS/QHP 5/>YRS: CPT

## 2017-04-19 PROCEDURE — 25000128 H RX IP 250 OP 636: Performed by: INTERNAL MEDICINE

## 2017-04-19 PROCEDURE — 99231 SBSQ HOSP IP/OBS SF/LOW 25: CPT | Performed by: INTERNAL MEDICINE

## 2017-04-19 PROCEDURE — 36415 COLL VENOUS BLD VENIPUNCTURE: CPT | Performed by: PHYSICIAN ASSISTANT

## 2017-04-19 PROCEDURE — 36415 COLL VENOUS BLD VENIPUNCTURE: CPT | Performed by: INTERNAL MEDICINE

## 2017-04-19 PROCEDURE — 80061 LIPID PANEL: CPT | Performed by: PHYSICIAN ASSISTANT

## 2017-04-19 PROCEDURE — 25000132 ZZH RX MED GY IP 250 OP 250 PS 637: Performed by: INTERNAL MEDICINE

## 2017-04-19 PROCEDURE — 85347 COAGULATION TIME ACTIVATED: CPT

## 2017-04-19 PROCEDURE — 85520 HEPARIN ASSAY: CPT | Performed by: PHYSICIAN ASSISTANT

## 2017-04-19 PROCEDURE — 27210787 ZZH MANIFOLD CR2

## 2017-04-19 PROCEDURE — B2151ZZ FLUOROSCOPY OF LEFT HEART USING LOW OSMOLAR CONTRAST: ICD-10-PCS | Performed by: INTERNAL MEDICINE

## 2017-04-19 PROCEDURE — 4A023N7 MEASUREMENT OF CARDIAC SAMPLING AND PRESSURE, LEFT HEART, PERCUTANEOUS APPROACH: ICD-10-PCS | Performed by: INTERNAL MEDICINE

## 2017-04-19 PROCEDURE — 99152 MOD SED SAME PHYS/QHP 5/>YRS: CPT | Performed by: INTERNAL MEDICINE

## 2017-04-19 PROCEDURE — 93458 L HRT ARTERY/VENTRICLE ANGIO: CPT

## 2017-04-19 PROCEDURE — C1760 CLOSURE DEV, VASC: HCPCS

## 2017-04-19 PROCEDURE — 27210946 ZZH KIT HC TOTES DISP CR8

## 2017-04-19 RX ORDER — VERAPAMIL HYDROCHLORIDE 2.5 MG/ML
1-2.5 INJECTION, SOLUTION INTRAVENOUS
Status: DISCONTINUED | OUTPATIENT
Start: 2017-04-19 | End: 2017-04-19 | Stop reason: HOSPADM

## 2017-04-19 RX ORDER — LORAZEPAM 0.5 MG/1
0.5 TABLET ORAL
Status: DISCONTINUED | OUTPATIENT
Start: 2017-04-19 | End: 2017-04-19 | Stop reason: HOSPADM

## 2017-04-19 RX ORDER — DEXTROSE MONOHYDRATE 25 G/50ML
12.5-5 INJECTION, SOLUTION INTRAVENOUS EVERY 30 MIN PRN
Status: DISCONTINUED | OUTPATIENT
Start: 2017-04-19 | End: 2017-04-19 | Stop reason: HOSPADM

## 2017-04-19 RX ORDER — DOPAMINE HYDROCHLORIDE 160 MG/100ML
2-20 INJECTION, SOLUTION INTRAVENOUS CONTINUOUS PRN
Status: DISCONTINUED | OUTPATIENT
Start: 2017-04-19 | End: 2017-04-19 | Stop reason: HOSPADM

## 2017-04-19 RX ORDER — ASPIRIN 325 MG
325 TABLET ORAL
Status: DISCONTINUED | OUTPATIENT
Start: 2017-04-19 | End: 2017-04-19 | Stop reason: HOSPADM

## 2017-04-19 RX ORDER — ATROPINE SULFATE 0.1 MG/ML
0.5 INJECTION INTRAVENOUS EVERY 5 MIN PRN
Status: DISCONTINUED | OUTPATIENT
Start: 2017-04-19 | End: 2017-04-19 | Stop reason: HOSPADM

## 2017-04-19 RX ORDER — NIFEDIPINE 10 MG/1
10 CAPSULE ORAL
Status: DISCONTINUED | OUTPATIENT
Start: 2017-04-19 | End: 2017-04-19 | Stop reason: HOSPADM

## 2017-04-19 RX ORDER — ASPIRIN 81 MG/1
81-324 TABLET, CHEWABLE ORAL
Status: DISCONTINUED | OUTPATIENT
Start: 2017-04-19 | End: 2017-04-19 | Stop reason: HOSPADM

## 2017-04-19 RX ORDER — IOPAMIDOL 755 MG/ML
55 INJECTION, SOLUTION INTRAVASCULAR ONCE
Status: COMPLETED | OUTPATIENT
Start: 2017-04-19 | End: 2017-04-19

## 2017-04-19 RX ORDER — ASPIRIN 81 MG/1
81 TABLET ORAL DAILY
Status: DISCONTINUED | OUTPATIENT
Start: 2017-04-20 | End: 2017-04-19

## 2017-04-19 RX ORDER — NITROGLYCERIN 5 MG/ML
100-200 VIAL (ML) INTRAVENOUS
Status: DISCONTINUED | OUTPATIENT
Start: 2017-04-19 | End: 2017-04-19 | Stop reason: HOSPADM

## 2017-04-19 RX ORDER — POTASSIUM CHLORIDE 1500 MG/1
20 TABLET, EXTENDED RELEASE ORAL
Status: DISCONTINUED | OUTPATIENT
Start: 2017-04-19 | End: 2017-04-19 | Stop reason: HOSPADM

## 2017-04-19 RX ORDER — FUROSEMIDE 10 MG/ML
20-100 INJECTION INTRAMUSCULAR; INTRAVENOUS
Status: DISCONTINUED | OUTPATIENT
Start: 2017-04-19 | End: 2017-04-19 | Stop reason: HOSPADM

## 2017-04-19 RX ORDER — NALOXONE HYDROCHLORIDE 0.4 MG/ML
.1-.4 INJECTION, SOLUTION INTRAMUSCULAR; INTRAVENOUS; SUBCUTANEOUS
Status: DISCONTINUED | OUTPATIENT
Start: 2017-04-19 | End: 2017-04-19 | Stop reason: HOSPADM

## 2017-04-19 RX ORDER — HEPARIN SODIUM 1000 [USP'U]/ML
1000-10000 INJECTION, SOLUTION INTRAVENOUS; SUBCUTANEOUS EVERY 5 MIN PRN
Status: DISCONTINUED | OUTPATIENT
Start: 2017-04-19 | End: 2017-04-19 | Stop reason: HOSPADM

## 2017-04-19 RX ORDER — DIPHENHYDRAMINE HYDROCHLORIDE 50 MG/ML
25-50 INJECTION INTRAMUSCULAR; INTRAVENOUS
Status: DISCONTINUED | OUTPATIENT
Start: 2017-04-19 | End: 2017-04-19 | Stop reason: HOSPADM

## 2017-04-19 RX ORDER — DOBUTAMINE HYDROCHLORIDE 200 MG/100ML
2-20 INJECTION INTRAVENOUS CONTINUOUS PRN
Status: DISCONTINUED | OUTPATIENT
Start: 2017-04-19 | End: 2017-04-19 | Stop reason: HOSPADM

## 2017-04-19 RX ORDER — PROTAMINE SULFATE 10 MG/ML
1-5 INJECTION, SOLUTION INTRAVENOUS
Status: DISCONTINUED | OUTPATIENT
Start: 2017-04-19 | End: 2017-04-19 | Stop reason: HOSPADM

## 2017-04-19 RX ORDER — FENTANYL CITRATE 50 UG/ML
25-50 INJECTION, SOLUTION INTRAMUSCULAR; INTRAVENOUS
Status: DISCONTINUED | OUTPATIENT
Start: 2017-04-19 | End: 2017-04-19 | Stop reason: HOSPADM

## 2017-04-19 RX ORDER — NITROGLYCERIN 5 MG/ML
100-500 VIAL (ML) INTRAVENOUS
Status: DISCONTINUED | OUTPATIENT
Start: 2017-04-19 | End: 2017-04-19 | Stop reason: HOSPADM

## 2017-04-19 RX ORDER — NITROGLYCERIN 20 MG/100ML
.07-1.88 INJECTION INTRAVENOUS CONTINUOUS PRN
Status: DISCONTINUED | OUTPATIENT
Start: 2017-04-19 | End: 2017-04-19 | Stop reason: HOSPADM

## 2017-04-19 RX ORDER — ATROPINE SULFATE 0.1 MG/ML
.5-1 INJECTION INTRAVENOUS
Status: DISCONTINUED | OUTPATIENT
Start: 2017-04-19 | End: 2017-04-19 | Stop reason: HOSPADM

## 2017-04-19 RX ORDER — LIDOCAINE 40 MG/G
CREAM TOPICAL
Status: DISCONTINUED | OUTPATIENT
Start: 2017-04-19 | End: 2017-04-19 | Stop reason: HOSPADM

## 2017-04-19 RX ORDER — EPTIFIBATIDE 2 MG/ML
2 INJECTION, SOLUTION INTRAVENOUS CONTINUOUS PRN
Status: DISCONTINUED | OUTPATIENT
Start: 2017-04-19 | End: 2017-04-19 | Stop reason: HOSPADM

## 2017-04-19 RX ORDER — MORPHINE SULFATE 2 MG/ML
1-2 INJECTION, SOLUTION INTRAMUSCULAR; INTRAVENOUS EVERY 5 MIN PRN
Status: DISCONTINUED | OUTPATIENT
Start: 2017-04-19 | End: 2017-04-19 | Stop reason: HOSPADM

## 2017-04-19 RX ORDER — HYDRALAZINE HYDROCHLORIDE 20 MG/ML
10-20 INJECTION INTRAMUSCULAR; INTRAVENOUS
Status: DISCONTINUED | OUTPATIENT
Start: 2017-04-19 | End: 2017-04-19 | Stop reason: HOSPADM

## 2017-04-19 RX ORDER — POTASSIUM CHLORIDE 7.45 MG/ML
10 INJECTION INTRAVENOUS
Status: DISCONTINUED | OUTPATIENT
Start: 2017-04-19 | End: 2017-04-19 | Stop reason: HOSPADM

## 2017-04-19 RX ORDER — FLUMAZENIL 0.1 MG/ML
0.2 INJECTION, SOLUTION INTRAVENOUS
Status: DISCONTINUED | OUTPATIENT
Start: 2017-04-19 | End: 2017-04-19 | Stop reason: HOSPADM

## 2017-04-19 RX ORDER — POTASSIUM CHLORIDE 29.8 MG/ML
20 INJECTION INTRAVENOUS
Status: DISCONTINUED | OUTPATIENT
Start: 2017-04-19 | End: 2017-04-19 | Stop reason: HOSPADM

## 2017-04-19 RX ORDER — BUPIVACAINE HYDROCHLORIDE 2.5 MG/ML
1-10 INJECTION, SOLUTION EPIDURAL; INFILTRATION; INTRACAUDAL
Status: DISCONTINUED | OUTPATIENT
Start: 2017-04-19 | End: 2017-04-19 | Stop reason: HOSPADM

## 2017-04-19 RX ORDER — PROTAMINE SULFATE 10 MG/ML
25-100 INJECTION, SOLUTION INTRAVENOUS EVERY 5 MIN PRN
Status: DISCONTINUED | OUTPATIENT
Start: 2017-04-19 | End: 2017-04-19 | Stop reason: HOSPADM

## 2017-04-19 RX ORDER — HYDROCODONE BITARTRATE AND ACETAMINOPHEN 5; 325 MG/1; MG/1
1-2 TABLET ORAL EVERY 4 HOURS PRN
Status: DISCONTINUED | OUTPATIENT
Start: 2017-04-19 | End: 2017-04-19 | Stop reason: HOSPADM

## 2017-04-19 RX ORDER — CLOPIDOGREL BISULFATE 75 MG/1
75 TABLET ORAL
Status: DISCONTINUED | OUTPATIENT
Start: 2017-04-19 | End: 2017-04-19 | Stop reason: HOSPADM

## 2017-04-19 RX ORDER — LORAZEPAM 2 MG/ML
.5-2 INJECTION INTRAMUSCULAR EVERY 4 HOURS PRN
Status: DISCONTINUED | OUTPATIENT
Start: 2017-04-19 | End: 2017-04-19 | Stop reason: HOSPADM

## 2017-04-19 RX ORDER — NALOXONE HYDROCHLORIDE 0.4 MG/ML
.2-.4 INJECTION, SOLUTION INTRAMUSCULAR; INTRAVENOUS; SUBCUTANEOUS
Status: DISCONTINUED | OUTPATIENT
Start: 2017-04-19 | End: 2017-04-19

## 2017-04-19 RX ORDER — METHYLPREDNISOLONE SODIUM SUCCINATE 125 MG/2ML
125 INJECTION, POWDER, LYOPHILIZED, FOR SOLUTION INTRAMUSCULAR; INTRAVENOUS
Status: DISCONTINUED | OUTPATIENT
Start: 2017-04-19 | End: 2017-04-19 | Stop reason: HOSPADM

## 2017-04-19 RX ORDER — ENALAPRILAT 1.25 MG/ML
1.25-2.5 INJECTION INTRAVENOUS
Status: DISCONTINUED | OUTPATIENT
Start: 2017-04-19 | End: 2017-04-19 | Stop reason: HOSPADM

## 2017-04-19 RX ORDER — LIDOCAINE HYDROCHLORIDE 10 MG/ML
1-10 INJECTION, SOLUTION EPIDURAL; INFILTRATION; INTRACAUDAL; PERINEURAL
Status: COMPLETED | OUTPATIENT
Start: 2017-04-19 | End: 2017-04-19

## 2017-04-19 RX ORDER — CLOPIDOGREL BISULFATE 75 MG/1
300-600 TABLET ORAL
Status: DISCONTINUED | OUTPATIENT
Start: 2017-04-19 | End: 2017-04-19 | Stop reason: HOSPADM

## 2017-04-19 RX ORDER — PROMETHAZINE HYDROCHLORIDE 25 MG/ML
6.25-25 INJECTION, SOLUTION INTRAMUSCULAR; INTRAVENOUS EVERY 4 HOURS PRN
Status: DISCONTINUED | OUTPATIENT
Start: 2017-04-19 | End: 2017-04-19 | Stop reason: HOSPADM

## 2017-04-19 RX ORDER — PHENYLEPHRINE HCL IN 0.9% NACL 1 MG/10 ML
20-100 SYRINGE (ML) INTRAVENOUS
Status: DISCONTINUED | OUTPATIENT
Start: 2017-04-19 | End: 2017-04-19 | Stop reason: HOSPADM

## 2017-04-19 RX ORDER — ADENOSINE 3 MG/ML
12-12000 INJECTION, SOLUTION INTRAVENOUS
Status: DISCONTINUED | OUTPATIENT
Start: 2017-04-19 | End: 2017-04-19 | Stop reason: HOSPADM

## 2017-04-19 RX ORDER — SODIUM CHLORIDE 9 MG/ML
INJECTION, SOLUTION INTRAVENOUS CONTINUOUS
Status: DISCONTINUED | OUTPATIENT
Start: 2017-04-19 | End: 2017-04-19

## 2017-04-19 RX ORDER — SODIUM NITROPRUSSIDE 25 MG/ML
100-200 INJECTION INTRAVENOUS
Status: DISCONTINUED | OUTPATIENT
Start: 2017-04-19 | End: 2017-04-19 | Stop reason: HOSPADM

## 2017-04-19 RX ORDER — SODIUM CHLORIDE 9 MG/ML
INJECTION, SOLUTION INTRAVENOUS CONTINUOUS
Status: DISCONTINUED | OUTPATIENT
Start: 2017-04-19 | End: 2017-04-19 | Stop reason: HOSPADM

## 2017-04-19 RX ORDER — ONDANSETRON 2 MG/ML
4 INJECTION INTRAMUSCULAR; INTRAVENOUS EVERY 4 HOURS PRN
Status: DISCONTINUED | OUTPATIENT
Start: 2017-04-19 | End: 2017-04-19 | Stop reason: HOSPADM

## 2017-04-19 RX ORDER — ACETAMINOPHEN 325 MG/1
325-650 TABLET ORAL EVERY 4 HOURS PRN
Status: DISCONTINUED | OUTPATIENT
Start: 2017-04-19 | End: 2017-04-19 | Stop reason: HOSPADM

## 2017-04-19 RX ORDER — NITROGLYCERIN 0.4 MG/1
0.4 TABLET SUBLINGUAL EVERY 5 MIN PRN
Status: DISCONTINUED | OUTPATIENT
Start: 2017-04-19 | End: 2017-04-19 | Stop reason: HOSPADM

## 2017-04-19 RX ORDER — NALOXONE HYDROCHLORIDE 0.4 MG/ML
0.4 INJECTION, SOLUTION INTRAMUSCULAR; INTRAVENOUS; SUBCUTANEOUS EVERY 5 MIN PRN
Status: DISCONTINUED | OUTPATIENT
Start: 2017-04-19 | End: 2017-04-19 | Stop reason: HOSPADM

## 2017-04-19 RX ORDER — METOPROLOL TARTRATE 1 MG/ML
5 INJECTION, SOLUTION INTRAVENOUS EVERY 5 MIN PRN
Status: DISCONTINUED | OUTPATIENT
Start: 2017-04-19 | End: 2017-04-19 | Stop reason: HOSPADM

## 2017-04-19 RX ORDER — LIDOCAINE HYDROCHLORIDE 10 MG/ML
30 INJECTION, SOLUTION EPIDURAL; INFILTRATION; INTRACAUDAL; PERINEURAL
Status: DISCONTINUED | OUTPATIENT
Start: 2017-04-19 | End: 2017-04-19 | Stop reason: HOSPADM

## 2017-04-19 RX ORDER — EPTIFIBATIDE 2 MG/ML
180 INJECTION, SOLUTION INTRAVENOUS EVERY 10 MIN PRN
Status: DISCONTINUED | OUTPATIENT
Start: 2017-04-19 | End: 2017-04-19 | Stop reason: HOSPADM

## 2017-04-19 RX ORDER — NICARDIPINE HYDROCHLORIDE 2.5 MG/ML
100 INJECTION INTRAVENOUS
Status: DISCONTINUED | OUTPATIENT
Start: 2017-04-19 | End: 2017-04-19 | Stop reason: HOSPADM

## 2017-04-19 RX ORDER — LORAZEPAM 2 MG/ML
0.5 INJECTION INTRAMUSCULAR
Status: DISCONTINUED | OUTPATIENT
Start: 2017-04-19 | End: 2017-04-19 | Stop reason: HOSPADM

## 2017-04-19 RX ORDER — PRASUGREL 10 MG/1
10-60 TABLET, FILM COATED ORAL
Status: DISCONTINUED | OUTPATIENT
Start: 2017-04-19 | End: 2017-04-19 | Stop reason: HOSPADM

## 2017-04-19 RX ADMIN — ASPIRIN 325 MG: 325 TABLET, DELAYED RELEASE ORAL at 08:17

## 2017-04-19 RX ADMIN — IOPAMIDOL 55 ML: 755 INJECTION, SOLUTION INTRAVASCULAR at 09:45

## 2017-04-19 RX ADMIN — HEPARIN SODIUM 1000 UNITS/HR: 10000 INJECTION, SOLUTION INTRAVENOUS at 06:12

## 2017-04-19 RX ADMIN — ACETAMINOPHEN 650 MG: 325 TABLET, FILM COATED ORAL at 02:19

## 2017-04-19 RX ADMIN — SODIUM CHLORIDE: 9 INJECTION, SOLUTION INTRAVENOUS at 06:12

## 2017-04-19 RX ADMIN — METOPROLOL TARTRATE 12.5 MG: 25 TABLET, FILM COATED ORAL at 08:16

## 2017-04-19 RX ADMIN — MIDAZOLAM HYDROCHLORIDE 2 MG: 1 INJECTION, SOLUTION INTRAMUSCULAR; INTRAVENOUS at 09:25

## 2017-04-19 RX ADMIN — FENTANYL CITRATE 50 MCG: 50 INJECTION, SOLUTION INTRAMUSCULAR; INTRAVENOUS at 09:24

## 2017-04-19 RX ADMIN — LIDOCAINE HYDROCHLORIDE 80 MG: 10 INJECTION, SOLUTION EPIDURAL; INFILTRATION; INTRACAUDAL; PERINEURAL at 09:24

## 2017-04-19 ASSESSMENT — PAIN DESCRIPTION - DESCRIPTORS: DESCRIPTORS: NAGGING

## 2017-04-19 NOTE — PROGRESS NOTES
[facility]    Cardiology Progress Note    Date of Service: 04/19/2017     Assessment & Plan   Dalila Roman is a 51 year old female with past medical history of morbid obesity, family HX of cad, seasonal allergies.  This patient was admitted on 4/18/2017 with symptoms of chest pain radiating down left arm.    1. Chest pain suggestive of angina pectoris  trops negative-no ischemic EKG changes  IV Heparin  Asa  Metoprolol  Statins  Coronary angiogram today  Headache from nitrates    2. LDL 90-placed on Simvastatin for now  If CAD, will need high intensity statins      3. Morbid obesity  BMI 44.36            Malachi Loepz, MSN, APRN, CNP  [facility]    Interval History   Headache from nitrates--no chest pain  Review of Systems:  The Review of Systems is negative other than noted in the HPI    Physical Exam   Temp: 98.3  F (36.8  C) Temp src: Axillary BP: 134/77 Pulse: 79 Heart Rate: 65 Resp: 16 SpO2: 96 % O2 Device: None (Room air)    Vitals:    04/18/17 1040 04/19/17 0506   Weight: 104.3 kg (230 lb) 106.5 kg (234 lb 12.6 oz)     Vital Signs with Ranges  Temp:  [97.5  F (36.4  C)-98.3  F (36.8  C)] 98.3  F (36.8  C)  Pulse:  [79] 79  Heart Rate:  [54-83] 65  Resp:  [8-23] 16  BP: (114-164)/() 134/77  SpO2:  [94 %-100 %] 96 %  I/O last 3 completed shifts:  In: 390.2 [P.O.:290; I.V.:100.2]  Out: -     Constitutional     alert and oriented, in no acute distress.     Skin     warm and dry to touch    ENT     no pallor or cyanosis    Neck    Supple, JVP normal, no carotid bruit    Chest     no tenderness to palpation     Lungs  clear to auscultation     Cardiac  regular rhythm, S1 normal, S2 normal, No S3 or S4, no murmurs, no rubs    Abdomen     abdomen soft, bowel sounds normoactive, no hepatosplenomegaly    Extremities and Back     no clubbing, cyanosis. No edema observed.        Neurological     no gross motor deficits noted, affect appropriate, oriented to time, person and  place.        Medications     NaCl 150 mL/hr at 04/19/17 0612     HEParin 1,000 Units/hr (04/19/17 0612)     - MEDICATION INSTRUCTIONS -       - MEDICATION INSTRUCTIONS -       Reason ACE/ARB order not selected         aspirin EC  325 mg Oral Daily     sodium chloride (PF)  3 mL Intracatheter Q8H     metoprolol  12.5 mg Oral BID     simvastatin  20 mg Oral QPM          Data:     ROUTINE IP LABS (Last four results)  BMP  Recent Labs  Lab 04/18/17  1037      POTASSIUM 4.0   CHLORIDE 107   REA 8.8   CO2 24   BUN 11   CR 0.74   GLC 95     CHOLESTEROL/HEPATIC  Recent Labs  Lab 04/19/17  0530 04/18/17  1037   CHOL 187  --    TRIG 136  --    LDL 90  --    HDL 70  --    ALT  --  29   AST  --  17     CBC  Recent Labs  Lab 04/18/17  1110 04/18/17  1037   WBC 7.7 Canceled, Test credited Quantity not sufficientNOTIFIED ER VIA TRACKBOARD AT 1048 BY [facility]   RBC 4.38 Canceled, Test credited Quantity not sufficientNOTIFIED ER VIA TRACKBOARD AT 1048 BY [facility]   HGB 14.0 Canceled, Test credited Quantity not sufficientNOTIFIED ER VIA TRACKBOARD AT 1048 BY [facility]   HCT 40.2 Canceled, Test credited Quantity not sufficientNOTIFIED ER VIA TRACKBOARD AT 1048 BY [facility]   MCV 92 Canceled, Test credited Quantity not sufficientNOTIFIED ER VIA TRACKBOARD AT 1048 BY [facility]   MCH 32.0 Canceled, Test credited Quantity not sufficientNOTIFIED ER VIA TRACKBOARD AT 1048 BY [facility]   MCHC 34.8 Canceled, Test credited Quantity not sufficientNOTIFIED ER VIA TRACKBOARD AT 1048 BY [facility]   RDW 13.1 Canceled, Test credited Quantity not sufficientNOTIFIED ER VIA TRACKBOARD AT 1048 BY [facility]    Canceled, Test credited Quantity not sufficientNOTIFIED ER VIA TRACKBOARD AT 1048 BY [facility]     TROP:   Recent Labs  Lab 04/18/17  1823 04/18/17  1545 04/18/17  1037   TROPI <0.015The 99th percentile for upper reference range is 0.045 ug/L.  Troponin values in the range of 0.045 - 0.120 ug/L may be associated with risks of adverse clinical events. <0.015The 99th percentile for upper  reference range is 0.045 ug/L.  Troponin values in the range of 0.045 - 0.120 ug/L may be associated with risks of adverse clinical events. <0.015The 99th percentile for upper reference range is 0.045 ug/L.  Troponin values in the range of 0.045 - 0.120 ug/L may be associated with risks of adverse clinical events.      BNP:  No results for input(s): NTBNPI in the last 168 hours.  INRNo lab results found in last 7 days.  TSH   Date Value Ref Range Status   06/08/2016 2.95 0.40 - 4.00 mU/L Final       EKG results:  Reviewed if available     Imaging:  Recent Results (from the past 24 hour(s))   XR Chest 2 Views    Narrative    XR CHEST 2 VW  4/18/2017 11:35 AM    HISTORY:  Chest pain    COMPARISON:  None.      Impression    IMPRESSION:  Negative.     ROSS OLIVEIRA MD     Telemetry: sinus

## 2017-04-19 NOTE — PLAN OF CARE
Problem: Goal Outcome Summary  Goal: Goal Outcome Summary  Outcome: Adequate for Discharge Date Met:  04/19/17  Discharge instructions reviewed with pt and spouse.  Pt had normal coronary arteries per angio.  Right groin is c/d/i, groin care instructions reviewed with pt.  No medication changes at this time.  Pt will f/u with primary doc in 1-2 weeks.  Pt states understanding of instructions.  Pt to d/c home with spouse.  LDA removed, and all belongings returned.

## 2017-04-19 NOTE — PLAN OF CARE
Problem: Goal Outcome Summary  Goal: Goal Outcome Summary  Outcome: No Change  A/O. Tele SR. Heparin gtt at 1000u/hour. Up ad tania. Denies pain Plan for angio tomorrow, NPO at 0000. Continue to monitor.

## 2017-04-19 NOTE — DISCHARGE INSTRUCTIONS
Going Home after ANGIOGRAM      Name: Dalila Roman  Medical Record Number:  [id number]  Today's Date: April 19, 2017        For 24 hours:         Have an adult stay with you for 24 hours.         Relax and take it easy.         Drink plenty of fluids.         You may eat your normal diet, unless your doctor tells you otherwise.         Do NOT make any important or legal decisions.         Do NOT drive or operate machines at home or at work.         Do NOT drink alcohol.      Do NOT smoke.     Medicines:         If you have begun Plavix (clopidogrel), Effient (prasugrel), or Brilinta (ticagrelor), do not stop taking it until you talk to your heart doctor (cardiologist).         If you are on metformin (Glucophage), do not restart it until you have blood tests (within 2 to 3 days after discharge). When your doctor tells you it is safe, you may restart the metformin.         If you have stopped any other medicines, check with your nurse or provider about when to restart them.    Care of groin site:         Remove the Band-Aid after 24 hours. If there is minor oozing, apply another Band-aid and remove it after 12 hours.          Do NOT take a bath, or use a hot tub or pool for at least 3 days. You may shower.          It is normal to have a small bruise or lump at the site.         Do not scrub the site.         Do not use lotion or powder near the puncture site for 3 days.         For the first 2 days: Do not stoop or squat. When you cough, sneeze or move your bowels, hold your hand over the puncture site and press gently.         Do not lift more than 10 pounds for at least 3 to 5 days.         For 2 days, do NOT have sex or do any heavy exercise.     If you start bleeding from the site in your groin:  Lie down flat and press firmly on the site.  Call your physician immediately, or, come to the emergency room.      Call 911 right away if you have bleeding that is heavy or does not stop.     Call your doctor if:          You have a large or growing hard lump around the site.         The site is red, swollen, hot or tender.         Blood or fluid is draining from the site.         You have chills or a fever greater than 101 F (38 C).         Your leg or arm turns bluish, feels numb or cool.         You have hives, a rash or unusual itching.       ADDITIONAL INSTRUCTIONS:    [facility] [place]:   [phone] (7 days a week)      Cardiology Fellow on call (24 hours per day) at [facility]:   [phone] (ask for Cardiology Fellow on call)      Number where we can reach you:  ____________

## 2017-04-19 NOTE — DISCHARGE SUMMARY
[facility]    Discharge Summary  Hospitalist    Date of Admission:  4/18/2017  Date of Discharge:  4/19/2017    Discharge Diagnoses      Non-cardiac chest pain    History of Present Illness   Dalila Roman is a 51-year-old female with past medical history of morbid obesity who presented to the Emergency Department today with complaints of chest pain. She was in her normal state of health when she was getting in the shower and she developed substernal chest pain that she describes as a heaviness or as though something was sitting on her chest. She was able to proceed with her morning and went to work. Her chest pain started radiating to her left arm and eventually to her jaw. When it radiated to her jaw, she elected to be evaluated by the school nurse. She was given aspirin and EMS was summoned. She was subsequently given 2 nitroglycerin by EMS and transferred to [facility].       In the Emergency Department, she was evaluated by Dr. Patrick. Vitals, she was hypertensive on arrival but remainder were unremarkable. Laboratory evaluation was unremarkable. EKG without nonischemic. She was given 2 additional nitroglycerin and initiated on nitroglycerin patch as well as heparin drip due to concern for unstable angina.     Hospital Course   Dalila Roman was admitted on 4/18/2017.  The following problems were addressed during her hospitalization:    Chest pain initially thought to be and treated as unstable angina but with negative coronary angiogram so likely this is reflux or musculoskeletal. She was initially admitted as in inpatient as she was thought to be having unstable angina.  She was started on aspirin and heparin drip and was treated as an MI.  Unexpectedly the angiogram was then negative so she is being discharged home.   -d-dimer, chest x-ray, serial troponins and coronary angiogram were negative  -with negative workup I think this is most likely musculoskeletal or reflux and  patient was instructed to follow up with her PCP if the pain recurs  Morbid obesity, BMI 43.4. Follow up with PCP.     Luis Wolff MD  [phone] (C)  [phone] (P)  Text Page (7 am to 6 pm)    Significant Results and Procedures   Coronary angiogram showed only mild CAD. Chest x-ray normal. D-dimer negative. Serial troponins negative.    Pending Results    None    Code Status   Full Code       Primary Care Physician   Claudette Dunn Serum    Physical Exam   Temp: 98.6  F (37  C) Temp src: Oral BP: (!) 155/97 Pulse: 79 Heart Rate: 65 Resp: 16 SpO2: 97 % O2 Device: None (Room air)    Vitals:    04/18/17 1040 04/19/17 0506   Weight: 104.3 kg (230 lb) 106.5 kg (234 lb 12.6 oz)     Vital Signs with Ranges  Temp:  [97.5  F (36.4  C)-98.6  F (37  C)] 98.6  F (37  C)  Pulse:  [79] 79  Heart Rate:  [55-83] 65  Resp:  [14-18] 16  BP: (114-161)/(63-97) 155/97  SpO2:  [94 %-97 %] 97 %  I/O last 3 completed shifts:  In: 390.2 [P.O.:290; I.V.:100.2]  Out: -     Constitutional: Awake, alert, cooperative, no apparent distress  Respiratory: Clear to auscultation bilaterally, no crackles or wheezing  Cardiovascular: Regular rate and rhythm, normal S1 and S2, and no murmur noted  GI: Normal bowel sounds, soft, non-distended, non-tender  Skin/Integumen: No rashes, no cyanosis, no edema  Other:     Discharge Disposition   Discharged to home  Condition at discharge: Stable    Consultations This Hospital Stay   CARDIOLOGY IP CONSULT  PHARMACY TO DOSE HEPARIN  SMOKING CESSATION PROGRAM IP CONSULT  SMOKING CESSATION PROGRAM IP CONSULT    Time Spent on this Encounter   ILuis, personally saw the patient today and spent less than 30 minutes discharging this patient.    Discharge Orders     Reason for your hospital stay   You were admitted with chest pain. Chest x-ray was normal. A d-dimer test that looks for blood clots was negative. We initially treated you like a heart attack but luckily the angiogram showed your  heart arteries were all open and functioning well so this pain is not thought to be from your heart.  It is most likely some reflux or muscular pain.  If it recurs you should follow up with your primary care physician as needed to continue to help you. It was a pleasure for our Hospitalist group and me personally to care for you at [facility].  We wish you a speedy recovery and good health!     Follow-up and recommended labs and tests    Follow up with primary care provider, Claudette Esrtada, as needed if your pain recurs.     Activity   Your activity upon discharge: activity as tolerated     Full Code     Diet   Follow this diet upon discharge: Regular diet       Discharge Medications   Current Discharge Medication List      CONTINUE these medications which have NOT CHANGED    Details   cetirizine (ZYRTEC) 10 MG tablet Take 1 tablet (10 mg) by mouth daily  Qty: 90 tablet, Refills: 3    Associated Diagnoses: Environmental allergies; Seasonal allergic rhinitis           Allergies   Allergies   Allergen Reactions     Codeine Sulfate Other (See Comments)     Hallucinations     Data   Most Recent 3 CBC's:  Recent Labs   Lab Test  04/18/17   1110  04/18/17   1037  06/08/16   0810   WBC  7.7  Canceled, Test credited   Quantity not sufficient  NOTIFIED ER VIA TRACKBOARD AT 1048 BY BrickfishP    7.3   HGB  14.0  Canceled, Test credited   Quantity not sufficient  NOTIFIED ER VIA TRACKBOARD AT 1048 BY BrickfishP    13.3   MCV  92  Canceled, Test credited   Quantity not sufficient  NOTIFIED ER VIA TRACKBOARD AT 1048 BY JMP    94   PLT  282  Canceled, Test credited   Quantity not sufficient  NOTIFIED ER VIA TRACKBOARD AT 1048 BY BrickfishP    290      Most Recent 3 BMP's:  Recent Labs   Lab Test  04/18/17   1037  06/08/16   0810  03/13/15   1040   NA  139  139  140   POTASSIUM  4.0  4.4  4.6   CHLORIDE  107  106  106   CO2  24  26  26   BUN  11  12  14   CR  0.74  0.77  0.78   ANIONGAP  8  7  8   REA  8.8  9.0  8.8   GLC  95   94  86     Most Recent 2 LFT's:  Recent Labs   Lab Test  04/18/17   1037  06/08/16   0810   AST  17  14   ALT  29  36   ALKPHOS  89  90   BILITOTAL  0.4  0.5     Most Recent INR's and Anticoagulation Dosing History:  Anticoagulation Dose History     There is no flowsheet data to display.        Most Recent 3 Troponin's:  Recent Labs   Lab Test  04/18/17   1823  04/18/17   1545  04/18/17   1037   TROPI  <0.015  The 99th percentile for upper reference range is 0.045 ug/L.  Troponin values in   the range of 0.045 - 0.120 ug/L may be associated with risks of adverse   clinical events.    <0.015  The 99th percentile for upper reference range is 0.045 ug/L.  Troponin values in   the range of 0.045 - 0.120 ug/L may be associated with risks of adverse   clinical events.    <0.015  The 99th percentile for upper reference range is 0.045 ug/L.  Troponin values in   the range of 0.045 - 0.120 ug/L may be associated with risks of adverse   clinical events.       Most Recent Cholesterol Panel:  Recent Labs   Lab Test  04/19/17   0530   CHOL  187   LDL  90   HDL  70   TRIG  136     Most Recent 6 Bacteria Isolates From Any Culture (See EPIC Reports for Culture Details):No lab results found.  Most Recent TSH, T4 and A1c Labs:  Recent Labs   Lab Test  06/08/16   0810  02/18/13   1029   TSH  2.95  2.17   A1C   --   5.3       Results for orders placed or performed during the hospital encounter of 04/18/17   XR Chest 2 Views    Narrative    XR CHEST 2 VW  4/18/2017 11:35 AM    HISTORY:  Chest pain    COMPARISON:  None.      Impression    IMPRESSION:  Negative.     ROSS OLIVEIRA MD

## 2017-04-19 NOTE — PLAN OF CARE
Problem: Goal Outcome Summary  Goal: Goal Outcome Summary  Outcome: No Change  Pt A&O. Pt denies chest pain, sob or dizziness. Pt reported some headache last night, pt states headache better after taking tylenol. Remains of heparin gtt at 1000u/hr. NS currently infusing at 150cc/hr. NPO since MN for Angio today. SR w/ 1st degree AVB on monitor. Will continue to monitor pt.

## 2017-04-20 ENCOUNTER — CARE COORDINATION (OUTPATIENT)
Dept: CARDIOLOGY | Facility: CLINIC | Age: 52
End: 2017-04-20

## 2017-04-20 ENCOUNTER — TELEPHONE (OUTPATIENT)
Dept: FAMILY MEDICINE | Facility: CLINIC | Age: 52
End: 2017-04-20

## 2017-04-20 NOTE — TELEPHONE ENCOUNTER
Please contact patient for In-patient follow up.  [phone] (home)     Visit date: 4/19/17  Diagnosis listed:Acute Chest Pain  Number of visits in past 12 months:0/1

## 2017-04-20 NOTE — PROGRESS NOTES
Called patient and left  to discuss any post hospital d/c questions she may have, review medication changes, and confirm f/u appts. RN advised patient in  that she should schedule a f/u apt with her PMD. Patient advised in  to call clinic with any cardiac related questions or concerns prior to her apt with PMD.

## 2017-06-09 ENCOUNTER — HOSPITAL ENCOUNTER (OUTPATIENT)
Dept: MAMMOGRAPHY | Facility: CLINIC | Age: 52
Discharge: HOME | End: 2017-06-09
Attending: INTERNAL MEDICINE | Admitting: INTERNAL MEDICINE
Payer: COMMERCIAL

## 2017-06-09 DIAGNOSIS — Z12.31 VISIT FOR SCREENING MAMMOGRAM: ICD-10-CM

## 2017-06-09 PROCEDURE — G0202 SCR MAMMO BI INCL CAD: HCPCS

## 2017-06-09 PROCEDURE — 77063 BREAST TOMOSYNTHESIS BI: CPT

## 2017-06-10 ENCOUNTER — HEALTH MAINTENANCE LETTER (OUTPATIENT)
Age: 52
End: 2017-06-10

## 2017-09-25 DIAGNOSIS — J30.2 SEASONAL ALLERGIC RHINITIS: ICD-10-CM

## 2017-09-25 DIAGNOSIS — Z91.09 ENVIRONMENTAL ALLERGIES: ICD-10-CM

## 2017-09-26 RX ORDER — CETIRIZINE HYDROCHLORIDE 10 MG/1
TABLET ORAL
Qty: 30 TABLET | Refills: 0 | Status: SHIPPED | OUTPATIENT
Start: 2017-09-26 | End: 2017-09-29

## 2017-09-29 ENCOUNTER — OFFICE VISIT (OUTPATIENT)
Dept: FAMILY MEDICINE | Facility: CLINIC | Age: 52
End: 2017-09-29
Payer: COMMERCIAL

## 2017-09-29 VITALS
TEMPERATURE: 97.7 F | SYSTOLIC BLOOD PRESSURE: 128 MMHG | DIASTOLIC BLOOD PRESSURE: 82 MMHG | RESPIRATION RATE: 16 BRPM | OXYGEN SATURATION: 98 % | HEART RATE: 59 BPM | HEIGHT: 61 IN | WEIGHT: 242 LBS | BODY MASS INDEX: 45.69 KG/M2

## 2017-09-29 DIAGNOSIS — E66.01 MORBID OBESITY, UNSPECIFIED OBESITY TYPE (H): Primary | ICD-10-CM

## 2017-09-29 DIAGNOSIS — Z91.09 ENVIRONMENTAL ALLERGIES: ICD-10-CM

## 2017-09-29 PROCEDURE — 99213 OFFICE O/P EST LOW 20 MIN: CPT | Performed by: PHYSICIAN ASSISTANT

## 2017-09-29 RX ORDER — CETIRIZINE HYDROCHLORIDE 10 MG/1
TABLET ORAL
Qty: 90 TABLET | Refills: 3 | Status: SHIPPED | OUTPATIENT
Start: 2017-09-29 | End: 2018-10-19

## 2017-09-29 NOTE — NURSING NOTE
"Chief Complaint   Patient presents with     Refill Request       Initial /82 (BP Location: Right arm, Patient Position: Chair, Cuff Size: Adult Large)  Pulse 59  Temp 97.7  F (36.5  C) (Oral)  Resp 16  Ht 5' 0.5\" (1.537 m)  Wt 242 lb (109.8 kg)  LMP  (LMP Unknown)  SpO2 98%  Breastfeeding? No  BMI 46.48 kg/m2 Estimated body mass index is 46.48 kg/(m^2) as calculated from the following:    Height as of this encounter: 5' 0.5\" (1.537 m).    Weight as of this encounter: 242 lb (109.8 kg).  Medication Reconciliation: complete   Elen Rogers MA      "

## 2017-09-29 NOTE — MR AVS SNAPSHOT
After Visit Summary   9/29/2017    Dalila Roman    MRN: [id number]           Patient Information     Date Of Birth          1965        Visit Information        Provider Department      9/29/2017 2:30 PM Shabana Solomon PA-C [facility] [place]        Today's Diagnoses     Morbid obesity, unspecified obesity type (H)    -  1    Environmental allergies          Care Instructions      Preventive Health Recommendations  Female Ages 50 - 64    Yearly exam: See your health care provider every year in order to  o Review health changes.   o Discuss preventive care.    o Review your medicines if your doctor has prescribed any.      Get a Pap test every three years (unless you have an abnormal result and your provider advises testing more often).    If you get Pap tests with HPV test, you only need to test every 5 years, unless you have an abnormal result.     You do not need a Pap test if your uterus was removed (hysterectomy) and you have not had cancer.    You should be tested each year for STDs (sexually transmitted diseases) if you're at risk.     Have a mammogram every 1 to 2 years.    Have a colonoscopy at age 50, or have a yearly FIT test (stool test). These exams screen for colon cancer.      Have a cholesterol test every 5 years, or more often if advised.    Have a diabetes test (fasting glucose) every three years. If you are at risk for diabetes, you should have this test more often.     If you are at risk for osteoporosis (brittle bone disease), think about having a bone density scan (DEXA).    Shots: Get a flu shot each year. Get a tetanus shot every 10 years.    Nutrition:     Eat at least 5 servings of fruits and vegetables each day.    Eat whole-grain bread, whole-wheat pasta and brown rice instead of white grains and rice.    Talk to your provider about Calcium and Vitamin D.     Lifestyle    Exercise at least 150 minutes a week (30 minutes a day, 5 days a week). This  "will help you control your weight and prevent disease.    Limit alcohol to one drink per day.    No smoking.     Wear sunscreen to prevent skin cancer.     See your dentist every six months for an exam and cleaning.    See your eye doctor every 1 to 2 years.            Follow-ups after your visit        Who to contact     If you have questions or need follow up information about today's clinic visit or your schedule please contact [facility] directly at [phone].  Normal or non-critical lab and imaging results will be communicated to you by [employer], letter or phone within 4 business days after the clinic has received the results. If you do not hear from us within 7 days, please contact the clinic through [employer] or phone. If you have a critical or abnormal lab result, we will notify you by phone as soon as possible.  Submit refill requests through [employer] or call your pharmacy and they will forward the refill request to us. Please allow 3 business days for your refill to be completed.          Additional Information About Your Visit        [employer] Information     [employer] gives you secure access to your electronic health record. If you see a primary care provider, you can also send messages to your care team and make appointments. If you have questions, please call your primary care clinic.  If you do not have a primary care provider, please call [phone] and they will assist you.        Care EveryWhere ID     This is your Care EveryWhere ID. This could be used by other organizations to access your [facility] medical records  [id number]        Your Vitals Were     Pulse Temperature Respirations Height Last Period Pulse Oximetry    59 97.7  F (36.5  C) (Oral) 16 5' 0.5\" (1.537 m) 08/15/2017 (Approximate) 98%    Breastfeeding? BMI (Body Mass Index)                No 46.48 kg/m2           Blood Pressure from Last 3 Encounters:   09/29/17 128/82   04/19/17 (!) 155/97   06/13/16 132/82    Weight " from Last 3 Encounters:   09/29/17 242 lb (109.8 kg)   04/19/17 234 lb 12.6 oz (106.5 kg)   06/13/16 237 lb 3.2 oz (107.6 kg)              Today, you had the following     No orders found for display         Today's Medication Changes          These changes are accurate as of: 9/29/17  2:40 PM.  If you have any questions, ask your nurse or doctor.               These medicines have changed or have updated prescriptions.        Dose/Directions    cetirizine 10 MG tablet   Commonly known as:  zyrTEC   This may have changed:  See the new instructions.   Used for:  Environmental allergies   Changed by:  Shabana Solomon PA-C        TAKE 1 TABLET(10 MG) BY MOUTH DAILY   Quantity:  90 tablet   Refills:  3            Where to get your medicines      These medications were sent to [facility] [address] [facility] [address] [facility] AT [place] [address] & [facility]  [address] [facility] [address]     Phone:  [phone]     cetirizine 10 MG tablet                Primary Care Provider Office Phone # Fax #    Shabana Solomon PA-C [phone] [address]  [facility] [address]        Equal Access to Services     ANTHONY SHORT AH: Hadii aad ku hadasho Soomaali, waaxda luqadaha, qaybta kaalmada adeegyada, waxay idiin hayaan jhon kharakaren layvette reyes. So [facility] [phone].    ATENCIÓN: Si habla español, tiene a neil disposición servicios gratuitos de asistencia lingüística. Llame al [phone].    We comply with applicable federal civil rights laws and [place] laws. We do not discriminate on the basis of race, color, national origin, age, disability, sex, sexual orientation, or gender identity.            Thank you!     Thank you for choosing [facility]  for your care. Our goal is always to provide you with excellent care. Hearing back from our patients is one way we can continue to improve our services. Please take a few minutes to complete the  written survey that you may receive in the mail after your visit with us. Thank you!             Your Updated Medication List - Protect others around you: Learn how to safely use, store and throw away your medicines at www.disposemymeds.org.          This list is accurate as of: 9/29/17  2:40 PM.  Always use your most recent med list.                   Brand Name Dispense Instructions for use Diagnosis    cetirizine 10 MG tablet    zyrTEC    90 tablet    TAKE 1 TABLET(10 MG) BY MOUTH DAILY    Environmental allergies

## 2017-09-29 NOTE — PROGRESS NOTES
SUBJECTIVE:   Dalila Roman is a 51 year old female who presents to clinic today for the following health issues:      Medication Followup of Zrytec    Taking Medication as prescribed: NO    Side Effects:  None    Pt wants to know about long-term side effects    Medication Helping Symptoms:  yes     Social History   Substance Use Topics     Smoking status: Former Smoker     Smokeless tobacco: Never Used     Alcohol use 0.0 oz/week     0 Standard drinks or equivalent per week      Comment: wine, belén sometimes, casual      Patient is here today for refill of allergy medications  She states she uses for environmental allergies  Having no symptoms  Wants to know about long term side effects    Problem list and histories reviewed & adjusted, as indicated.  Additional history: as documented    Patient Active Problem List   Diagnosis     CARDIOVASCULAR SCREENING; LDL GOAL LESS THAN 160     Environmental allergies     ACS (acute coronary syndrome) (H)     Morbid obesity, unspecified obesity type (H)     Past Surgical History:   Procedure Laterality Date      SECTION      twice,  and      ENT SURGERY      wisdom tooth extraction     ORTHOPEDIC SURGERY      right foot     SOFT TISSUE SURGERY      carpel tunnel       Social History   Substance Use Topics     Smoking status: Former Smoker     Smokeless tobacco: Never Used     Alcohol use 0.0 oz/week     0 Standard drinks or equivalent per week      Comment: wine, belén sometimes, casual     Family History   Problem Relation Age of Onset     DIABETES Mother      Breast Cancer Mother      early 40's     Hypertension Mother      HEART DISEASE Father      MI     Cancer - colorectal Brother      1 brother - 38     DIABETES Brother      2 brothers     HEART DISEASE Brother      1 brother had MI s/p triple by pass     Thyroid Disease Daughter 19     Hypothyroidism         Current Outpatient Prescriptions   Medication Sig Dispense Refill     cetirizine  "(ZYRTEC) 10 MG tablet TAKE 1 TABLET(10 MG) BY MOUTH DAILY 90 tablet 3     Allergies   Allergen Reactions     Codeine Sulfate Other (See Comments)     Hallucinations         Reviewed and updated as needed this visit by clinical staffTobacco  Allergies  Med Hx  Surg Hx  Fam Hx  Soc Hx      Reviewed and updated as needed this visit by Provider         ROS:  Constitutional, HEENT, cardiovascular, pulmonary, gi and gu systems are negative, except as otherwise noted.      OBJECTIVE:   /82 (BP Location: Right arm, Patient Position: Chair, Cuff Size: Adult Large)  Pulse 59  Temp 97.7  F (36.5  C) (Oral)  Resp 16  Ht 5' 0.5\" (1.537 m)  Wt 242 lb (109.8 kg)  LMP 08/15/2017 (Approximate)  SpO2 98%  Breastfeeding? No  BMI 46.48 kg/m2  Body mass index is 46.48 kg/(m^2).  GENERAL: healthy, alert and no distress  EYES: Eyes grossly normal to inspection, PERRL and conjunctivae and sclerae normal  HENT: ear canals and TM's normal, nose and mouth without ulcers or lesions  NECK: no adenopathy, no asymmetry, masses, or scars and thyroid normal to palpation  RESP: lungs clear to auscultation - no rales, rhonchi or wheezes  CV: regular rate and rhythm, normal S1 S2, no S3 or S4, no murmur, click or rub, no peripheral edema and peripheral pulses strong  MS: no gross musculoskeletal defects noted, no edema    Diagnostic Test Results:  none     ASSESSMENT/PLAN:     1. Environmental allergies  Chronic issue, stable, meds refilled for 1 year.   F/U as needed.  - cetirizine (ZYRTEC) 10 MG tablet; TAKE 1 TABLET(10 MG) BY MOUTH DAILY  Dispense: 90 tablet; Refill: 3    2. Morbid obesity, unspecified obesity type (H)  Discussed options for referral to Endocrine or Bariatric Surgery, patient declined.      Risks, benefits and alternatives were discussed with patient. Agreeable to the plan of care.      Shabana Solomon PA-C  [facility]    "

## 2017-09-29 NOTE — PROGRESS NOTES
"   SUBJECTIVE:   CC: Dalila Roman is an 51 year old woman who presents for preventive health visit.     Physical   Annual:     Getting at least 3 servings of Calcium per day::  Yes    Bi-annual eye exam::  Yes    Dental care twice a year::  Yes    Sleep apnea or symptoms of sleep apnea::  None    Diet::  Regular (no restrictions)    Frequency of exercise::  2-3 days/week    Duration of exercise::  30-45 minutes    Taking medications regularly::  Not Applicable    Additional concerns today::  No    {Outside tests to abstract? :282654}    {additional problems to add (Optional):511378}    Today's PHQ-2 Score:   PHQ-2 ( 1999 Pfizer) 9/27/2017   Q1: Little interest or pleasure in doing things 0   Q2: Feeling down, depressed or hopeless 0   PHQ-2 Score 0   Q1: Little interest or pleasure in doing things Not at all   Q2: Feeling down, depressed or hopeless Not at all   PHQ-2 Score 0       Abuse: Current or Past(Physical, Sexual or Emotional)- {YES/NO/NA:433840}  Do you feel safe in your environment - {YES/NO/NA:006644}    Social History   Substance Use Topics     Smoking status: Former Smoker     Smokeless tobacco: Never Used     Alcohol use 0.0 oz/week     0 Standard drinks or equivalent per week      Comment: wine, belén sometimes, casual     {ETOH AUDIT:780373}    Reviewed orders with patient.  Reviewed health maintenance and updated orders accordingly - {Yes/No:661019::\"Yes\"}  {Chronicprobdata (Optional):127812}    {Mammo Decision Support (Optional):220445}    Pertinent mammograms are reviewed under the imaging tab.  History of abnormal Pap smear: {PAP HX:999608}    Reviewed and updated as needed this visit by clinical staff         Reviewed and updated as needed this visit by Provider        {HISTORY OPTIONS (Optional):578398}    ROS:  {FEMALE PREVENTATIVE ROS:580282}     OBJECTIVE:   There were no vitals taken for this visit.  EXAM:  {Exam Choices:513584}    ASSESSMENT/PLAN:   {Diag " "Picklist:522966}    COUNSELING:  {FEMALE COUNSELING MESSAGES:897902::\"Reviewed preventive health counseling, as reflected in patient instructions\"}    {BP Counseling- Complete if BP >= 120/80  (Optional):477333}     reports that she has quit smoking. She has never used smokeless tobacco.  {Tobacco Cessation -- Complete if patient is a smoker (Optional):136994}  Estimated body mass index is 44.36 kg/(m^2) as calculated from the following:    Height as of 4/18/17: 5' 1\" (1.549 m).    Weight as of 4/19/17: 234 lb 12.6 oz (106.5 kg).   {Weight Management Plan (ACO) Complete if BMI is abnormal-  Ages 18-64  BMI >24.9.  Age 65+ with BMI <23 or >30 (Optional):821892}    Counseling Resources:  ATP IV Guidelines  Pooled Cohorts Equation Calculator  Breast Cancer Risk Calculator  FRAX Risk Assessment  ICSI Preventive Guidelines  Dietary Guidelines for Americans, 2010  [employer]'s MyPlate  ASA Prophylaxis  Lung CA Screening    Shabana Solomon PA-C  [facility] [place]  Answers for HPI/ROS submitted by the patient on 9/27/2017   PHQ-2 Score: 0    Injectable Influenza Immunization Documentation    1.  Is the person to be vaccinated sick today?  {YES/NO DEFAULT NO:62709::\" No\"}    2. Does the person to be vaccinated have an allergy to a component   of the vaccine?  {YES/NO DEFAULT NO:47913::\" No\"}    3. Has the person to be vaccinated ever had a serious reaction   to influenza vaccine in the past?  {YES/NO DEFAULT NO:87729::\" No\"}    4. Has the person to be vaccinated ever had Guillain-Barré syndrome?  {YES/NO DEFAULT NO:14848::\" No\"}    Form completed by ***         "

## 2018-03-23 ENCOUNTER — OFFICE VISIT (OUTPATIENT)
Dept: FAMILY MEDICINE | Facility: CLINIC | Age: 53
End: 2018-03-23
Payer: COMMERCIAL

## 2018-03-23 VITALS
TEMPERATURE: 97.5 F | HEIGHT: 61 IN | OXYGEN SATURATION: 100 % | SYSTOLIC BLOOD PRESSURE: 126 MMHG | RESPIRATION RATE: 16 BRPM | WEIGHT: 245.8 LBS | BODY MASS INDEX: 46.41 KG/M2 | HEART RATE: 80 BPM | DIASTOLIC BLOOD PRESSURE: 78 MMHG

## 2018-03-23 DIAGNOSIS — L08.9 FACIAL INFECTION: Primary | ICD-10-CM

## 2018-03-23 PROBLEM — I24.9 ACS (ACUTE CORONARY SYNDROME) (H): Status: RESOLVED | Noted: 2017-04-18 | Resolved: 2018-03-23

## 2018-03-23 PROCEDURE — 99214 OFFICE O/P EST MOD 30 MIN: CPT | Performed by: PHYSICIAN ASSISTANT

## 2018-03-23 RX ORDER — SULFAMETHOXAZOLE/TRIMETHOPRIM 800-160 MG
1 TABLET ORAL 2 TIMES DAILY
Qty: 20 TABLET | Refills: 0 | Status: SHIPPED | OUTPATIENT
Start: 2018-03-23 | End: 2018-04-02

## 2018-03-23 NOTE — MR AVS SNAPSHOT
After Visit Summary   3/23/2018    Dalila Roman    MRN: [id number]           Patient Information     Date Of Birth          1965        Visit Information        Provider Department      3/23/2018 1:30 PM Shabana Solomon PA-C [facility]        Today's Diagnoses     Facial infection    -  1       Follow-ups after your visit        Follow-up notes from your care team     Return in about 3 days (around 3/26/2018) for signs or symptoms of infection.      Your next 10 appointments already scheduled     Mar 26, 2018  2:30 PM CDT   SHORT with Shabana Solomon PA-C   [facility] ([facility])    [address] [facility] [address] [phone]              Who to contact     If you have questions or need follow up information about today's clinic visit or your schedule please contact [facility] directly at [phone].  Normal or non-critical lab and imaging results will be communicated to you by [email or web address], letter or phone within 4 business days after the clinic has received the results. If you do not hear from us within 7 days, please contact the clinic through [email or web address] or phone. If you have a critical or abnormal lab result, we will notify you by phone as soon as possible.  Submit refill requests through [employer] or call your pharmacy and they will forward the refill request to us. Please allow 3 business days for your refill to be completed.          Additional Information About Your Visit        MyChart Information     [employer] gives you secure access to your electronic health record. If you see a primary care provider, you can also send messages to your care team and make appointments. If you have questions, please call your primary care clinic.  If you do not have a primary care provider, please call [phone] and they will assist you.        Care EveryWhere ID     This is your Care EveryWhere ID. This could  "be used by other organizations to access your [facility] medical records  [id number]        Your Vitals Were     Pulse Temperature Respirations Height Last Period Pulse Oximetry    80 97.5  F (36.4  C) (Oral) 16 5' 0.5\" (1.537 m) 03/12/2018 (Exact Date) 100%    Breastfeeding? BMI (Body Mass Index)                No 47.21 kg/m2           Blood Pressure from Last 3 Encounters:   03/23/18 126/78   09/29/17 128/82   04/19/17 (!) 155/97    Weight from Last 3 Encounters:   03/23/18 245 lb 12.8 oz (111.5 kg)   09/29/17 242 lb (109.8 kg)   04/19/17 234 lb 12.6 oz (106.5 kg)              Today, you had the following     No orders found for display         Today's Medication Changes          These changes are accurate as of 3/23/18  1:45 PM.  If you have any questions, ask your nurse or doctor.               Start taking these medicines.        Dose/Directions    sulfamethoxazole-trimethoprim 800-160 MG per tablet   Commonly known as:  BACTRIM DS/SEPTRA DS   Used for:  Facial infection   Started by:  Shabana Solomon PA-C        Dose:  1 tablet   Take 1 tablet by mouth 2 times daily   Quantity:  20 tablet   Refills:  0            Where to get your medicines      These medications were sent to [facility] [id number] [place] [id number] [facility] AT [place] [address] & [facility]  [address] [place] [address]     Phone:  [phone]     sulfamethoxazole-trimethoprim 800-160 MG per tablet                Primary Care Provider Office Phone # Fax #    Shabana Solomon PA-C [phone] [address] [address] [facility] [address]        Equal Access to Services     ANTHONY SHORT AH: Hailey Kwan, wapilarda luqadaha, qaybta kaalmada adeegyada, dora reyes. So [facility] [phone].    ATENCIÓN: Si habla español, tiene a neil disposición servicios gratuitos de asistencia lingüística. Jerald al [phone].    We comply with applicable " federal civil rights laws and [place] laws. We do not discriminate on the basis of race, color, national origin, age, disability, sex, sexual orientation, or gender identity.            Thank you!     Thank you for choosing [facility]  for your care. Our goal is always to provide you with excellent care. Hearing back from our patients is one way we can continue to improve our services. Please take a few minutes to complete the written survey that you may receive in the mail after your visit with us. Thank you!             Your Updated Medication List - Protect others around you: Learn how to safely use, store and throw away your medicines at www.disposemymeds.org.          This list is accurate as of 3/23/18  1:45 PM.  Always use your most recent med list.                   Brand Name Dispense Instructions for use Diagnosis    cetirizine 10 MG tablet    zyrTEC    90 tablet    TAKE 1 TABLET(10 MG) BY MOUTH DAILY    Environmental allergies       sulfamethoxazole-trimethoprim 800-160 MG per tablet    BACTRIM DS/SEPTRA DS    20 tablet    Take 1 tablet by mouth 2 times daily    Facial infection

## 2018-03-23 NOTE — PROGRESS NOTES
SUBJECTIVE:   Dalila Roman is a 52 year old female who presents to clinic today for the following health issues:      Facial swelling      Duration: 5 days    Description (location/character/radiation): right side facial swelling, had a zit on Monday, swelling moved down side of face, swelling moved further down face with each day, sore to touch    Intensity:  mild    Accompanying signs and symptoms: occasional itchiness    History (similar episodes/previous evaluation): None    Precipitating or alleviating factors: None    Therapies tried and outcome: None     Patient is here today for evaluation of right side facial swelling  Ongoing since Monday, seems to be spreading  Patient notes it started on Monday with a small zit present, she kind of rubbed it and ever since then the swelling has progressed  No pain, no fever, no discharge  Has treated with no medication      Problem list and histories reviewed & adjusted, as indicated.  Additional history: as documented    Patient Active Problem List   Diagnosis     CARDIOVASCULAR SCREENING; LDL GOAL LESS THAN 160     Environmental allergies     Morbid obesity, unspecified obesity type (H)     Past Surgical History:   Procedure Laterality Date      SECTION      twice,  and      ENDOMETRIAL SAMPLING (BIOPSY)  2017    negative; done for DUB     ENT SURGERY      wisdom tooth extraction     ORTHOPEDIC SURGERY      right foot     SOFT TISSUE SURGERY      carpel tunnel       Social History   Substance Use Topics     Smoking status: Former Smoker     Smokeless tobacco: Never Used     Alcohol use 0.0 oz/week     0 Standard drinks or equivalent per week      Comment: wine, belén sometimes, casual     Family History   Problem Relation Age of Onset     DIABETES Mother      Breast Cancer Mother      early 40's     Hypertension Mother      HEART DISEASE Father      MI     Cancer - colorectal Brother      1 brother - 38     DIABETES Brother      2  "brothers     HEART DISEASE Brother      1 brother had MI s/p triple by pass     Thyroid Disease Daughter 19     Hypothyroidism         Current Outpatient Prescriptions   Medication Sig Dispense Refill     sulfamethoxazole-trimethoprim (BACTRIM DS/SEPTRA DS) 800-160 MG per tablet Take 1 tablet by mouth 2 times daily 20 tablet 0     cetirizine (ZYRTEC) 10 MG tablet TAKE 1 TABLET(10 MG) BY MOUTH DAILY 90 tablet 3     Allergies   Allergen Reactions     Codeine Sulfate Other (See Comments)     Hallucinations       Reviewed and updated as needed this visit by clinical staff  Tobacco  Allergies  Meds  Med Hx  Surg Hx  Fam Hx  Soc Hx      Reviewed and updated as needed this visit by Provider         ROS:  Constitutional, HEENT, cardiovascular, pulmonary, gi and gu systems are negative, except as otherwise noted.    OBJECTIVE:     /78 (BP Location: Right arm, Patient Position: Chair, Cuff Size: Adult Large)  Pulse 80  Temp 97.5  F (36.4  C) (Oral)  Resp 16  Ht 5' 0.5\" (1.537 m)  Wt 245 lb 12.8 oz (111.5 kg)  LMP 03/12/2018 (Exact Date)  SpO2 100%  Breastfeeding? No  BMI 47.21 kg/m2  Body mass index is 47.21 kg/(m^2).  GENERAL: healthy, alert and no distress  EYES: Eyes grossly normal to inspection, PERRL and conjunctivae and sclerae normal  HENT: ear canals and TM's normal, nose and mouth without ulcers or lesions, oropharynx clear, oral mucous membranes moist and right side facial swelling near temporal region and in front of ear, mildly tender. Above the right ear is small slight flaky open sore. Swelling extends from preauricular to anterior cervical neck line  RESP: lungs clear to auscultation - no rales, rhonchi or wheezes  CV: regular rate and rhythm, normal S1 S2, no S3 or S4, no murmur, click or rub, no peripheral edema and peripheral pulses strong  MS: no gross musculoskeletal defects noted, no edema    Diagnostic Test Results:  none     ASSESSMENT/PLAN:             1. Facial infection  New " problem, suspect open sore caused bacterial infection with subsequent lymphadenopathy. Recommend treatment with Bactrim BID x 10 days, advised to go to ER over weekend if fever, increased swelling or trouble breathing. Recheck on Monday.  - sulfamethoxazole-trimethoprim (BACTRIM DS/SEPTRA DS) 800-160 MG per tablet; Take 1 tablet by mouth 2 times daily  Dispense: 20 tablet; Refill: 0    Risks, benefits and alternatives were discussed with patient. Agreeable to the plan of care.      Shabana Solomon PA-C  [facility]

## 2018-03-26 ENCOUNTER — HOSPITAL ENCOUNTER (EMERGENCY)
Facility: CLINIC | Age: 53
Discharge: HOME | End: 2018-03-26
Attending: EMERGENCY MEDICINE | Admitting: EMERGENCY MEDICINE
Payer: COMMERCIAL

## 2018-03-26 ENCOUNTER — OFFICE VISIT (OUTPATIENT)
Dept: FAMILY MEDICINE | Facility: CLINIC | Age: 53
End: 2018-03-26
Payer: COMMERCIAL

## 2018-03-26 ENCOUNTER — APPOINTMENT (OUTPATIENT)
Dept: CT IMAGING | Facility: CLINIC | Age: 53
End: 2018-03-26
Attending: EMERGENCY MEDICINE
Payer: COMMERCIAL

## 2018-03-26 VITALS
RESPIRATION RATE: 16 BRPM | DIASTOLIC BLOOD PRESSURE: 78 MMHG | WEIGHT: 245 LBS | HEIGHT: 61 IN | SYSTOLIC BLOOD PRESSURE: 154 MMHG | TEMPERATURE: 97.4 F | HEART RATE: 72 BPM | OXYGEN SATURATION: 99 % | BODY MASS INDEX: 46.26 KG/M2

## 2018-03-26 VITALS
RESPIRATION RATE: 16 BRPM | DIASTOLIC BLOOD PRESSURE: 93 MMHG | HEART RATE: 81 BPM | TEMPERATURE: 98.3 F | SYSTOLIC BLOOD PRESSURE: 161 MMHG | OXYGEN SATURATION: 97 %

## 2018-03-26 DIAGNOSIS — L03.211 FACIAL CELLULITIS: ICD-10-CM

## 2018-03-26 DIAGNOSIS — R03.0 ELEVATED BLOOD PRESSURE READING WITHOUT DIAGNOSIS OF HYPERTENSION: ICD-10-CM

## 2018-03-26 DIAGNOSIS — L08.9 FACIAL INFECTION: Primary | ICD-10-CM

## 2018-03-26 LAB
ANION GAP SERPL CALCULATED.3IONS-SCNC: 6 MMOL/L (ref 3–14)
BASOPHILS # BLD AUTO: 0.1 10E9/L (ref 0–0.2)
BASOPHILS NFR BLD AUTO: 0.9 %
BUN SERPL-MCNC: 17 MG/DL (ref 7–30)
CALCIUM SERPL-MCNC: 9.1 MG/DL (ref 8.5–10.1)
CHLORIDE SERPL-SCNC: 105 MMOL/L (ref 94–109)
CO2 SERPL-SCNC: 26 MMOL/L (ref 20–32)
CREAT SERPL-MCNC: 1.01 MG/DL (ref 0.52–1.04)
DIFFERENTIAL METHOD BLD: NORMAL
EOSINOPHIL # BLD AUTO: 0.1 10E9/L (ref 0–0.7)
EOSINOPHIL NFR BLD AUTO: 2.1 %
ERYTHROCYTE [DISTWIDTH] IN BLOOD BY AUTOMATED COUNT: 13.2 % (ref 10–15)
GFR SERPL CREATININE-BSD FRML MDRD: 58 ML/MIN/1.7M2
GLUCOSE SERPL-MCNC: 96 MG/DL (ref 70–99)
HCT VFR BLD AUTO: 41.6 % (ref 35–47)
HGB BLD-MCNC: 14.2 G/DL (ref 11.7–15.7)
IMM GRANULOCYTES # BLD: 0 10E9/L (ref 0–0.4)
IMM GRANULOCYTES NFR BLD: 0.2 %
LYMPHOCYTES # BLD AUTO: 2.3 10E9/L (ref 0.8–5.3)
LYMPHOCYTES NFR BLD AUTO: 34.5 %
MCH RBC QN AUTO: 31.4 PG (ref 26.5–33)
MCHC RBC AUTO-ENTMCNC: 34.1 G/DL (ref 31.5–36.5)
MCV RBC AUTO: 92 FL (ref 78–100)
MONOCYTES # BLD AUTO: 0.8 10E9/L (ref 0–1.3)
MONOCYTES NFR BLD AUTO: 12.8 %
NEUTROPHILS # BLD AUTO: 3.3 10E9/L (ref 1.6–8.3)
NEUTROPHILS NFR BLD AUTO: 49.5 %
NRBC # BLD AUTO: 0 10*3/UL
NRBC BLD AUTO-RTO: 0 /100
PLATELET # BLD AUTO: 250 10E9/L (ref 150–450)
PLATELET # BLD EST: NORMAL 10*3/UL
POTASSIUM SERPL-SCNC: 3.8 MMOL/L (ref 3.4–5.3)
RBC # BLD AUTO: 4.52 10E12/L (ref 3.8–5.2)
RBC MORPH BLD: NORMAL
SODIUM SERPL-SCNC: 137 MMOL/L (ref 133–144)
WBC # BLD AUTO: 6.6 10E9/L (ref 4–11)

## 2018-03-26 PROCEDURE — 99213 OFFICE O/P EST LOW 20 MIN: CPT | Performed by: PHYSICIAN ASSISTANT

## 2018-03-26 PROCEDURE — 70491 CT SOFT TISSUE NECK W/DYE: CPT

## 2018-03-26 PROCEDURE — 85025 COMPLETE CBC W/AUTO DIFF WBC: CPT | Performed by: EMERGENCY MEDICINE

## 2018-03-26 PROCEDURE — 80048 BASIC METABOLIC PNL TOTAL CA: CPT | Performed by: EMERGENCY MEDICINE

## 2018-03-26 PROCEDURE — 25000128 H RX IP 250 OP 636: Performed by: EMERGENCY MEDICINE

## 2018-03-26 PROCEDURE — 99285 EMERGENCY DEPT VISIT HI MDM: CPT | Mod: 25

## 2018-03-26 RX ORDER — IOPAMIDOL 755 MG/ML
500 INJECTION, SOLUTION INTRAVASCULAR ONCE
Status: COMPLETED | OUTPATIENT
Start: 2018-03-26 | End: 2018-03-26

## 2018-03-26 RX ORDER — LIDOCAINE 40 MG/G
CREAM TOPICAL
Status: DISCONTINUED | OUTPATIENT
Start: 2018-03-26 | End: 2018-03-26 | Stop reason: HOSPADM

## 2018-03-26 RX ADMIN — SODIUM CHLORIDE 65 ML: 9 INJECTION, SOLUTION INTRAVENOUS at 17:05

## 2018-03-26 RX ADMIN — IOPAMIDOL 80 ML: 755 INJECTION, SOLUTION INTRAVENOUS at 17:05

## 2018-03-26 ASSESSMENT — ENCOUNTER SYMPTOMS: FACIAL SWELLING: 1

## 2018-03-26 NOTE — PROGRESS NOTES
SUBJECTIVE:   Dalila Roman is a 52 year old female who presents to clinic today for the following health issues:    Had OV on 3/23/18 for right side facial infection  Was given Bactrim  Current status: has been getting more sores, thinks it is spreading  Area is still red and swollen    Patient is here today to follow up on right side facial infection  She notes over the weekend, she has been taking her antibiotics without improvement  She actually feels like the rash may be spreading  She is now having jaw pain  No fever  Feels run down      Problem list and histories reviewed & adjusted, as indicated.  Additional history: as documented    Patient Active Problem List   Diagnosis     CARDIOVASCULAR SCREENING; LDL GOAL LESS THAN 160     Environmental allergies     Morbid obesity, unspecified obesity type (H)     Past Surgical History:   Procedure Laterality Date      SECTION      twice,  and      ENDOMETRIAL SAMPLING (BIOPSY)  2017    negative; done for DUB     ENT SURGERY      wisdom tooth extraction     ORTHOPEDIC SURGERY      right foot     SOFT TISSUE SURGERY      carpel tunnel       Social History   Substance Use Topics     Smoking status: Former Smoker     Smokeless tobacco: Never Used     Alcohol use 0.0 oz/week     0 Standard drinks or equivalent per week      Comment: wine, belén sometimes, casual     Family History   Problem Relation Age of Onset     DIABETES Mother      Breast Cancer Mother      early 40's     Hypertension Mother      HEART DISEASE Father      MI     Cancer - colorectal Brother      1 brother - 38     DIABETES Brother      2 brothers     HEART DISEASE Brother      1 brother had MI s/p triple by pass     Thyroid Disease Daughter 19     Hypothyroidism         Current Outpatient Prescriptions   Medication Sig Dispense Refill     sulfamethoxazole-trimethoprim (BACTRIM DS/SEPTRA DS) 800-160 MG per tablet Take 1 tablet by mouth 2 times daily 20 tablet 0      "cetirizine (ZYRTEC) 10 MG tablet TAKE 1 TABLET(10 MG) BY MOUTH DAILY 90 tablet 3     Allergies   Allergen Reactions     Codeine Sulfate Other (See Comments)     Hallucinations       Reviewed and updated as needed this visit by clinical staff  Tobacco  Allergies  Meds  Med Hx  Surg Hx  Fam Hx  Soc Hx      Reviewed and updated as needed this visit by Provider         ROS:  Constitutional, HEENT, cardiovascular, pulmonary, gi and gu systems are negative, except as otherwise noted.    OBJECTIVE:     /78 (BP Location: Right arm, Patient Position: Chair, Cuff Size: Adult Large)  Pulse 72  Temp 97.4  F (36.3  C) (Oral)  Resp 16  Ht 5' 0.5\" (1.537 m)  Wt 245 lb (111.1 kg)  LMP 03/12/2018 (Exact Date)  SpO2 99%  Breastfeeding? No  BMI 47.06 kg/m2  Body mass index is 47.06 kg/(m^2).  GENERAL: healthy, alert and no distress  EYES: Eyes grossly normal to inspection, PERRL and conjunctivae and sclerae normal  HENT: ear canals and TM's normal, nose and mouth without ulcers or lesions, oropharynx clear, oral mucous membranes moist and right side facial swelling near temporal region and in front of ear, mildly tender. Above the right ear is small slight flaky open sore. Swelling extends from preauricular to anterior cervical neck line  RESP: lungs clear to auscultation - no rales, rhonchi or wheezes  CV: regular rate and rhythm, normal S1 S2, no S3 or S4, no murmur, click or rub, no peripheral edema and peripheral pulses strong  MS: no gross musculoskeletal defects noted, no edema    Diagnostic Test Results:  none     ASSESSMENT/PLAN:             1. Facial infection  Ongoing issue, not improving despite antibiotics.  Recommend ER visit, as she may need CT of face to see if abscess or parotid gland swelling or IV antibiotics are needed.   F/U once released from ER.    2. Elevated blood pressure reading without diagnosis of hypertension  Continue to monitor.      Risks, benefits and alternatives were discussed " with patient. Agreeable to the plan of care.      Shabana Solomon PA-C  [facility]

## 2018-03-26 NOTE — ED AVS SNAPSHOT
[facility] Emergency Department    [address] [place] [address]    Phone:  [phone]    Fax:  [phone]                                       Dalila Roman   MRN: [id number]    Department:  [facility] Emergency Department   Date of Visit:  3/26/2018           Patient Information     Date Of Birth          1965        Your diagnoses for this visit were:     Facial cellulitis        You were seen by Esvin Diaz MD.      Follow-up Information     Follow up with [facility] In 1 week.    Why:  for re-evaluation of your symptoms if not improved    Contact information:    [address] [place] [id number] [phone]          Discharge Instructions       Discharge Instructions  Cellulitis    Cellulitis is an infection of the skin that occurs when bacteria enter the skin.   Symptoms are generally redness, swelling, warmth and pain.  Your infection appeared to be appropriate to treat at home with antibiotics.  However, sometimes your infection may be worse than it seemed at first, or may worsen with time. If you have new or worse symptoms, you may need to be seen again in the Emergency Department or by your primary provider.    Generally, every Emergency Department visit should have a follow-up clinic visit with either a primary or a specialty clinic/provider. Please follow-up as instructed by your emergency provider today.    Return to the Emergency Department if:    The redness, pain, or swelling gets a lot worse.  If the red area was marked, return if it is red significantly beyond the marked area.    You are unable to get your antibiotics, or are vomiting (throwing up) these pills, or you cannot take them.    You are feeling more ill, weak or lightheaded.    You start to run a new fever (temperature >101 F).    Anything else about the infection worries or concerns you.  Treatment:    Start your antibiotics right away, and take them  as prescribed. Be sure to finish the whole prescription, even if you are better.    Apply a heating pad, warm packs, or warm water soaks to the infected area for 15 minutes at a time, at least 3 times a day. Do not use a heating pad on your feet or legs if you have diabetes. Do not sleep with a heating pad on, since this can cause burns or skin injury.    Rest your injured area for at least 1-2 days. After that you may start using your extremity again as long as there is not too much pain.     Raise the injured area above the level of your heart as much as possible in the first 1-2 days.    Tylenol  (acetaminophen), Motrin  (ibuprofen), or Advil  (ibuprofen) may help may help reduce pain and fever and may help you feel more comfortable. Be sure to read and follow the package directions, and ask your provider if you have questions.    If you were given a prescription for medicine here today, be sure to read all of the information (including the package insert) that comes with your prescription.  This will include important information about the medicine, its side effects, and any warnings that you need to know about.  The pharmacist who fills the prescription can provide more information and answer questions you may have about the medicine.  If you have questions or concerns that the pharmacist cannot address, please call or return to the Emergency Department.     Remember that you can always come back to the Emergency Department if you are not able to see your regular provider in the amount of time listed above, if you get any new symptoms, or if there is anything that worries you.      24 Hour Appointment Hotline       To make an appointment at any [facility], call [phone] ([phone]). If you don't have a family doctor or clinic, we will help you find one. [facility] clinics are conveniently located to serve the needs of you and your family.             Review of your medicines      START taking         Dose / Directions Last dose taken    amoxicillin-clavulanate 875-125 MG per tablet   Commonly known as:  AUGMENTIN   Dose:  1 tablet   Quantity:  14 tablet        Take 1 tablet by mouth 2 times daily for 7 days   Refills:  0          Our records show that you are taking the medicines listed below. If these are incorrect, please call your family doctor or clinic.        Dose / Directions Last dose taken    cetirizine 10 MG tablet   Commonly known as:  zyrTEC   Quantity:  90 tablet        TAKE 1 TABLET(10 MG) BY MOUTH DAILY   Refills:  3        sulfamethoxazole-trimethoprim 800-160 MG per tablet   Commonly known as:  BACTRIM DS/SEPTRA DS   Dose:  1 tablet   Quantity:  20 tablet        Take 1 tablet by mouth 2 times daily   Refills:  0                Prescriptions were sent or printed at these locations (1 Prescription)                   Other Prescriptions                Printed at Department/Unit printer (1 of 1)         amoxicillin-clavulanate (AUGMENTIN) 875-125 MG per tablet                Procedures and tests performed during your visit     Basic metabolic panel    CBC with platelets differential    Peripheral IV catheter    Soft tissue neck CT w contrast      Orders Needing Specimen Collection     None      Pending Results     No orders found from 3/24/2018 to 3/27/2018.            Pending Culture Results     No orders found from 3/24/2018 to 3/27/2018.            Pending Results Instructions     If you had any lab results that were not finalized at the time of your Discharge, you can call the ED Lab Result RN at [phone]. You will be contacted by this team for any positive Lab results or changes in treatment. The nurses are available 7 days a week from 10A to 6:30P.  You can leave a message 24 hours per day and they will return your call.        Test Results From Your Hospital Stay        3/26/2018  4:24 PM      Component Results     Component Value Ref Range & Units Status    WBC 6.6 4.0 - 11.0 10e9/L  Final    RBC Count 4.52 3.8 - 5.2 10e12/L Final    Hemoglobin 14.2 11.7 - 15.7 g/dL Final    Hematocrit 41.6 35.0 - 47.0 % Final    MCV 92 78 - 100 fl Final    MCH 31.4 26.5 - 33.0 pg Final    MCHC 34.1 31.5 - 36.5 g/dL Final    RDW 13.2 10.0 - 15.0 % Final    Platelet Count 250 150 - 450 10e9/L Final    Diff Method Automated Method  Final    % Neutrophils 49.5 % Final    % Lymphocytes 34.5 % Final    % Monocytes 12.8 % Final    % Eosinophils 2.1 % Final    % Basophils 0.9 % Final    % Immature Granulocytes 0.2 % Final    Nucleated RBCs 0 0 /100 Final    Absolute Neutrophil 3.3 1.6 - 8.3 10e9/L Final    Absolute Lymphocytes 2.3 0.8 - 5.3 10e9/L Final    Absolute Monocytes 0.8 0.0 - 1.3 10e9/L Final    Absolute Eosinophils 0.1 0.0 - 0.7 10e9/L Final    Absolute Basophils 0.1 0.0 - 0.2 10e9/L Final    Abs Immature Granulocytes 0.0 0 - 0.4 10e9/L Final    Absolute Nucleated RBC 0.0  Final    RBC Morphology   Final    Consistent with reported results    Platelet Estimate   Final    Automated count confirmed.  Platelet morphology is normal.         3/26/2018  4:11 PM      Component Results     Component Value Ref Range & Units Status    Sodium 137 133 - 144 mmol/L Final    Potassium 3.8 3.4 - 5.3 mmol/L Final    Chloride 105 94 - 109 mmol/L Final    Carbon Dioxide 26 20 - 32 mmol/L Final    Anion Gap 6 3 - 14 mmol/L Final    Glucose 96 70 - 99 mg/dL Final    Urea Nitrogen 17 7 - 30 mg/dL Final    Creatinine 1.01 0.52 - 1.04 mg/dL Final    GFR Estimate 58 (L) >60 mL/min/1.7m2 Final    Non  GFR Calc    GFR Estimate If Black 70 >60 mL/min/1.7m2 Final    African American GFR Calc    Calcium 9.1 8.5 - 10.1 mg/dL Final         3/26/2018  5:45 PM      Narrative     CT SCAN OF THE NECK WITH CONTRAST  3/26/2018 5:13 PM     HISTORY: Right pre-auricular swelling, tenderness.    TECHNIQUE:  Axial images and coronal reformations. Radiation dose for  this scan was reduced using automated exposure control, adjustment  of  the mA and/or kV according to patient size, or iterative  reconstruction technique. 80mL [id number]-370 IV.     COMPARISON: None.    FINDINGS:   Visualized sinuses, nasopharynx and orbits: Normal.      Tongue, oral cavity and oropharynx:  Normal.      Hypopharynx: Normal.      Larynx and trachea: Normal.      Thyroid: Normal.    Submandibular glands: Normal.      Parotid glands: There is mild subcutaneous reticulation within the  preauricular soft tissues superficial to the right parotid gland. The  parotid glands are symmetric and demonstrate normal enhancement. No  abnormal ductal dilatation or parotid gland stone is appreciated.        Lymph nodes: Cervical chain lymph nodes are prominent in size and  number bilaterally, right slightly greater than left. The majority of  lymph nodes have normal fatty go. No necrotic lymph nodes are seen.  These are favored to be reactive.      Vasculature: Retropharyngeal course of the carotid arteries  bilaterally.      Upper mediastinum and lungs: Normal.      Bones: Normal.        Impression     IMPRESSION:     1. Mild subcutaneous stranding and inflammation in the right  preauricular region overlying the right parotid gland. No abscess is  appreciated. Normal CT appearance of the parotid gland. This is  favored to represent an infectious etiology such as cellulitis.  2. Prominent bilateral cervical chain lymph nodes, right greater than  left, favored to be reactive.      DOMENICO RAMIREZ MD                Clinical Quality Measure: Blood Pressure Screening     Your blood pressure was checked while you were in the emergency department today. The last reading we obtained was  BP: 141/80 . Please read the guidelines below about what these numbers mean and what you should do about them.  If your systolic blood pressure (the top number) is less than 120 and your diastolic blood pressure (the bottom number) is less than 80, then your blood pressure is normal. There is nothing more  that you need to do about it.  If your systolic blood pressure (the top number) is 120-139 or your diastolic blood pressure (the bottom number) is 80-89, your blood pressure may be higher than it should be. You should have your blood pressure rechecked within a year by a primary care provider.  If your systolic blood pressure (the top number) is 140 or greater or your diastolic blood pressure (the bottom number) is 90 or greater, you may have high blood pressure. High blood pressure is treatable, but if left untreated over time it can put you at risk for heart attack, stroke, or kidney failure. You should have your blood pressure rechecked by a primary care provider within the next 4 weeks.  If your provider in the emergency department today gave you specific instructions to follow-up with your doctor or provider even sooner than that, you should follow that instruction and not wait for up to 4 weeks for your follow-up visit.        Thank you for choosing [facility]       Thank you for choosing [facility] for your care. Our goal is always to provide you with excellent care. Hearing back from our patients is one way we can continue to improve our services. Please take a few minutes to complete the written survey that you may receive in the mail after you visit with us. Thank you!        [facility] Information     [facility] gives you secure access to your electronic health record. If you see a primary care provider, you can also send messages to your care team and make appointments. If you have questions, please call your primary care clinic.  If you do not have a primary care provider, please call [phone] and they will assist you.        Care EveryWhere ID     This is your Care EveryWhere ID. This could be used by other organizations to access your [facility] medical records  [id number]        Equal Access to Services     ANTHONY SHORT AH: avi Garcia, dora segura  george pang ah. So [facility] [phone].    ATENCIÓN: Si habla español, tiene a neil disposición servicios gratuitos de asistencia lingüística. Llame al [phone].    We comply with applicable federal civil rights laws and [place] laws. We do not discriminate on the basis of race, color, national origin, age, disability, sex, sexual orientation, or gender identity.            After Visit Summary       This is your record. Keep this with you and show to your community pharmacist(s) and doctor(s) at your next visit.

## 2018-03-26 NOTE — ED PROVIDER NOTES
History     Chief Complaint:  Facial Swelling     HPI   Dalila Roman is a 52 year old female who presents to the emergency department today referred from clinic for evaluation of facial swelling. The patient was initially seen at her primary care clinic on 3/23 with right sided temporal facial swelling and was found to have an infection. At that time she was started on Bactrim and underwent follow up for recheck in clinic today. Today in clinic the patient states she felt worse and not improved, so she was referred to the emergency department for evaluation. The patient states the swelling to the right side of her face progressed each day last week, initially from above her right ear, until finally encompassing her entire right preauricular area. This area has also felt very warm the entire time. Patient states she has been compliant with her Bactrim, taking seven doses thus far. She denies history of similar type symptoms, or history of cellulitis.     Allergies:  Codeine Sulfate      Medications:    sulfamethoxazole-trimethoprim (BACTRIM DS/SEPTRA DS) 800-160 MG per tablet   cetirizine (ZYRTEC) 10 MG tablet     Past Medical History:    History reviewed. No pertinent past medical history.     Past Surgical History:     section   Endometrial sampling   ENT surgery   Right foot surgery   Carpal tunnel surgery     Family History:    Diabetes   Breast cancer   Hypertension   Heart disease     Social History:  The patient was accompanied to the ED by herself.  Smoking Status: Former smoker  Smokeless Tobacco: No  Alcohol Use: Yes    Marital Status:        Review of Systems   HENT: Positive for facial swelling.    All other systems reviewed and are negative.    Physical Exam     Patient Vitals for the past 24 hrs:   BP Temp Temp src Pulse Resp SpO2   18 1642 141/80 - - - - -   18 1641 - - - - - 97 %   18 1503 (!) 158/103 - - - - -   18 1502 - 98.3  F (36.8  C) Oral 81 16 99 %       Physical Exam  General: Patient is alert and cooperative.  HENT:  Mild right sided facial swelling in pre-auricular region.  Trace calor, no erythema, mild tenderness, no palpable mass or abnormality.  Normal right ear.  Normal oropharynx, including well appearing dentition.  Normal tongue, posterior pharynx.  Eyes: EOMI. Normal conjunctiva.  Neck:  Normal range of motion and appearance. No swelling or tenderness.   Cardiovascular:  Normal rate.  Pulmonary/Chest:  Effort normal.  Abdominal: Soft. No distension or tenderness.     Musculoskeletal: Normal range of motion. No edema or tenderness.   Neurological: oriented, normal strength, sensation, and coordination.   Skin: Warm and dry. No rash or bruising.   Psychiatric: Normal mood and affect. Normal behavior and judgement.    Emergency Department Course     Imaging:  Radiology findings were communicated with the patient who voiced understanding of the findings.    Soft Tissue neck CT contrast:  IMPRESSION:     1. Mild subcutaneous stranding and inflammation in the right  preauricular region overlying the right parotid gland. No abscess is  appreciated. Normal CT appearance of the parotid gland. This is  favored to represent an infectious etiology such as cellulitis.  2. Prominent bilateral cervical chain lymph nodes, right greater than  left, favored to be reactive.  Report per radiology      Laboratory:  Laboratory findings were communicated with the patient who voiced understanding of the findings.    CBC: WBC 6.6, HGB 14.2,   BMP: GFR Estimate 58 (L), o/w WNL (Creatinine 1.01)     Emergency Department Course:  Nursing notes and vitals reviewed.  1513 I performed an exam of the patient as documented above.   The patient was sent for a soft tissue neck CT while in the emergency department, results above.    IV was inserted and blood was drawn for laboratory testing, results above.   1810 I rechecked the patient and updated her on her CT and blood work  results.   Findings and plan explained to the Patient. Patient discharged home with instructions regarding supportive care, medications, and reasons to return. The importance of close follow-up was reviewed. The patient was prescribed Augmentin. I personally reviewed the laboratory and imaging results with the Patient and answered all related questions prior to discharge.   Impression & Plan      Medical Decision Making:  Dalila Roman is a 52 year old female who presents for evaluation of facial swelling.  The history, physical exam and supporting data are consistent with facial cellulitis here in ED.  CT of the face confirms cellulitis, but shows no evidence of abscess or other complication.  There do not appear at this time to be any complication of cellulitis including necrotizing fascitis, lymphangitis, lymphadenitis, osteomyelitis, sepsis, or shock.  The patient is not immunosuppressed.      Supportive outpatient management is indicated with antibiotics.  Close follow-up of primary care physician to ensure no progression and rapid resolution.  Facial cellulitis precautions for home.  I doubt serious etiologies of head and neck infection complications including Lemierre's syndrome, Dariusz's angina, retropharyngeal abscess, sinus venous thrombosis, venous sinus infection, etc.    Diagnosis:    ICD-10-CM    1. Facial cellulitis L03.211        Disposition:  Discharged to home with the below prescription.     Discharge Medications:  New Prescriptions    AMOXICILLIN-CLAVULANATE (AUGMENTIN) 875-125 MG PER TABLET    Take 1 tablet by mouth 2 times daily for 7 days       Scribe Disclosure:  I, Vic Villarreal, am serving as a scribe at 2:59 PM on 3/26/2018 to document services personally performed by Esvin Diaz MD based on my observations and the provider's statements to me.    3/26/2018   [facility] EMERGENCY DEPARTMENT       Esvin Diaz MD  03/28/18 2104

## 2018-03-26 NOTE — ED AVS SNAPSHOT
[facility] Emergency Department    [address]    [facility] [address]    Phone:  [phone]    Fax:  [phone]                                       Dalila Roman   MRN: [id number]    Department:  [facility] Emergency Department   Date of Visit:  3/26/2018           After Visit Summary Signature Page     I have received my discharge instructions, and my questions have been answered. I have discussed any challenges I see with this plan with the nurse or doctor.    ..........................................................................................................................................  Patient/Patient Representative Signature      ..........................................................................................................................................  Patient Representative Print Name and Relationship to Patient    ..................................................               ................................................  Date                                            Time    ..........................................................................................................................................  Reviewed by Signature/Title    ...................................................              ..............................................  Date                                                            Time

## 2018-03-26 NOTE — ED NOTES
Right sided facial swelling started Friday started on bactrim has had 7 doses swelling and redness worse pain in ear swelling noted near ear and down side of throat no respiratory complaints. A/ox 3. ABC's intact.

## 2018-03-26 NOTE — MR AVS SNAPSHOT
"              After Visit Summary   3/26/2018    Dalila Roman    MRN: [id number]           Patient Information     Date Of Birth          1965        Visit Information        Provider Department      3/26/2018 2:30 PM Shabana Solomon PA-C [facility]        Today's Diagnoses     Facial infection    -  1       Follow-ups after your visit        Follow-up notes from your care team     Return in about 1 week (around 4/2/2018) for go to ER.      Who to contact     If you have questions or need follow up information about today's clinic visit or your schedule please contact [facility] directly at [phone].  Normal or non-critical lab and imaging results will be communicated to you by MyChart, letter or phone within 4 business days after the clinic has received the results. If you do not hear from us within 7 days, please contact the clinic through [facility] or phone. If you have a critical or abnormal lab result, we will notify you by phone as soon as possible.  Submit refill requests through [facility] or call your pharmacy and they will forward the refill request to us. Please allow 3 business days for your refill to be completed.          Additional Information About Your Visit        MyChart Information     [facility] gives you secure access to your electronic health record. If you see a primary care provider, you can also send messages to your care team and make appointments. If you have questions, please call your primary care clinic.  If you do not have a primary care provider, please call [phone] and they will assist you.        Care EveryWhere ID     This is your Care EveryWhere ID. This could be used by other organizations to access your [facility] medical records  [id number]        Your Vitals Were     Pulse Temperature Respirations Height Last Period Pulse Oximetry    72 97.4  F (36.3  C) (Oral) 16 5' 0.5\" (1.537 m) 03/12/2018 (Exact Date) 99%    Breastfeeding? " BMI (Body Mass Index)                No 47.06 kg/m2           Blood Pressure from Last 3 Encounters:   03/26/18 154/78   03/23/18 126/78   09/29/17 128/82    Weight from Last 3 Encounters:   03/26/18 245 lb (111.1 kg)   03/23/18 245 lb 12.8 oz (111.5 kg)   09/29/17 242 lb (109.8 kg)              Today, you had the following     No orders found for display       Primary Care Provider Office Phone # Fax #    Shabana Solomon PA-C [phone] [address] [facility] [address]        Equal Access to Services     ANGLE SHORT : Hadii shyam Kwan, waaxda luantonioadaha, qaybta kaalmada adekaminiyada, dora pang . So [facility] [phone].    ATENCIÓN: Si habla español, tiene a neil disposición servicios gratuitos de asistencia lingüística. Llame al [phone].    We comply with applicable federal civil rights laws and [place] laws. We do not discriminate on the basis of race, color, national origin, age, disability, sex, sexual orientation, or gender identity.            Thank you!     Thank you for choosing [facility]  for your care. Our goal is always to provide you with excellent care. Hearing back from our patients is one way we can continue to improve our services. Please take a few minutes to complete the written survey that you may receive in the mail after your visit with us. Thank you!             Your Updated Medication List - Protect others around you: Learn how to safely use, store and throw away your medicines at www.disposemymeds.org.          This list is accurate as of 3/26/18  2:30 PM.  Always use your most recent med list.                   Brand Name Dispense Instructions for use Diagnosis    cetirizine 10 MG tablet    zyrTEC    90 tablet    TAKE 1 TABLET(10 MG) BY MOUTH DAILY    Environmental allergies       sulfamethoxazole-trimethoprim 800-160 MG per tablet    BACTRIM DS/SEPTRA DS    20 tablet    Take 1 tablet by mouth 2  times daily    Facial infection

## 2018-04-02 ENCOUNTER — OFFICE VISIT (OUTPATIENT)
Dept: FAMILY MEDICINE | Facility: CLINIC | Age: 53
End: 2018-04-02
Payer: COMMERCIAL

## 2018-04-02 VITALS
SYSTOLIC BLOOD PRESSURE: 130 MMHG | BODY MASS INDEX: 46.26 KG/M2 | HEART RATE: 72 BPM | WEIGHT: 245 LBS | DIASTOLIC BLOOD PRESSURE: 80 MMHG | RESPIRATION RATE: 16 BRPM | TEMPERATURE: 97.4 F | OXYGEN SATURATION: 100 % | HEIGHT: 61 IN

## 2018-04-02 DIAGNOSIS — L08.9 FACIAL INFECTION: Primary | ICD-10-CM

## 2018-04-02 PROCEDURE — 99213 OFFICE O/P EST LOW 20 MIN: CPT | Performed by: PHYSICIAN ASSISTANT

## 2018-04-02 NOTE — PROGRESS NOTES
SUBJECTIVE:   Dalila Roman is a 52 year old female who presents to clinic today for the following health issues:      ED/UC Followup:    Facility:  [facility], [place]  Date of visit: 3/26/18  Reason for visit: facial cellulitits  Current Status: patient reports swelling has gone down, no pain or hard areas on face     Patient is here today for follow up on facial cellulitis  She notes the pain and swelling have improved  Was in ER on 3/26, had CT scan which did not show abscess  They added on additional antibiotic, which she finished today in conjunction with the Bactrim prescribed here  She denies fever, chills, pain  She is feeling well  No other concerns      Problem list and histories reviewed & adjusted, as indicated.  Additional history: as documented    Patient Active Problem List   Diagnosis     CARDIOVASCULAR SCREENING; LDL GOAL LESS THAN 160     Environmental allergies     Morbid obesity, unspecified obesity type (H)     Past Surgical History:   Procedure Laterality Date      SECTION      twice,  and      ENDOMETRIAL SAMPLING (BIOPSY)  2017    negative; done for DUB     ENT SURGERY      wisdom tooth extraction     ORTHOPEDIC SURGERY      right foot     SOFT TISSUE SURGERY      carpel tunnel       Social History   Substance Use Topics     Smoking status: Former Smoker     Smokeless tobacco: Never Used     Alcohol use 0.0 oz/week     0 Standard drinks or equivalent per week      Comment: wine, belén sometimes, casual     Family History   Problem Relation Age of Onset     DIABETES Mother      Breast Cancer Mother      early 40's     Hypertension Mother      HEART DISEASE Father      MI     Cancer - colorectal Brother      1 brother - 38     DIABETES Brother      2 brothers     HEART DISEASE Brother      1 brother had MI s/p triple by pass     Thyroid Disease Daughter 19     Hypothyroidism         Current Outpatient Prescriptions   Medication Sig Dispense Refill     cetirizine (ZYRTEC) 10  "MG tablet TAKE 1 TABLET(10 MG) BY MOUTH DAILY 90 tablet 3     Allergies   Allergen Reactions     Codeine Sulfate Other (See Comments)     Hallucinations       Reviewed and updated as needed this visit by clinical staff  Tobacco  Allergies  Meds  Med Hx  Surg Hx  Fam Hx  Soc Hx      Reviewed and updated as needed this visit by Provider         ROS:  Constitutional, HEENT, cardiovascular, pulmonary, gi and gu systems are negative, except as otherwise noted.    OBJECTIVE:     /80 (BP Location: Right arm, Patient Position: Chair, Cuff Size: Adult Large)  Pulse 72  Temp 97.4  F (36.3  C) (Oral)  Resp 16  Ht 5' 0.5\" (1.537 m)  Wt 245 lb (111.1 kg)  LMP 03/12/2018 (Exact Date)  SpO2 100%  Breastfeeding? No  BMI 47.06 kg/m2  Body mass index is 47.06 kg/(m^2).  GENERAL: healthy, alert and no distress  NECK: no adenopathy, no asymmetry, masses, or scars and thyroid normal to palpation  RESP: lungs clear to auscultation - no rales, rhonchi or wheezes  CV: regular rate and rhythm, normal S1 S2, no S3 or S4, no murmur, click or rub, no peripheral edema and peripheral pulses strong  ABDOMEN: soft, nontender, no hepatosplenomegaly, no masses and bowel sounds normal  MS: no gross musculoskeletal defects noted, no edema    Diagnostic Test Results:  none     ASSESSMENT/PLAN:             1. Facial infection  Improved, no need for further antibiotics or follow up.  Advised against scratching the scab.  Continue to monitor and f/u if symptoms worsen or do not improve.      Risks, benefits and alternatives were discussed with patient. Agreeable to the plan of care.      Shabana Solomon PA-C  [facility]  "

## 2018-04-02 NOTE — MR AVS SNAPSHOT
"              After Visit Summary   4/2/2018    Dalila Roman    MRN: [id number]           Patient Information     Date Of Birth          1965        Visit Information        Provider Department      4/2/2018 9:10 AM Shabana Solomon PA-C [facility]        Today's Diagnoses     Facial infection    -  1       Follow-ups after your visit        Follow-up notes from your care team     Return in about 1 week (around 4/9/2018) for If symptoms worsen or fail to improve.      Who to contact     If you have questions or need follow up information about today's clinic visit or your schedule please contact [facility] directly at [phone].  Normal or non-critical lab and imaging results will be communicated to you by MyChart, letter or phone within 4 business days after the clinic has received the results. If you do not hear from us within 7 days, please contact the clinic through [employer] or phone. If you have a critical or abnormal lab result, we will notify you by phone as soon as possible.  Submit refill requests through [employer] or call your pharmacy and they will forward the refill request to us. Please allow 3 business days for your refill to be completed.          Additional Information About Your Visit        MyChart Information     [employer] gives you secure access to your electronic health record. If you see a primary care provider, you can also send messages to your care team and make appointments. If you have questions, please call your primary care clinic.  If you do not have a primary care provider, please call [phone] and they will assist you.        Care EveryWhere ID     This is your Care EveryWhere ID. This could be used by other organizations to access your [facility] medical records  [id number]        Your Vitals Were     Pulse Temperature Respirations Height Last Period Pulse Oximetry    72 97.4  F (36.3  C) (Oral) 16 5' 0.5\" (1.537 m) 03/12/2018 (Exact " Date) 100%    Breastfeeding? BMI (Body Mass Index)                No 47.06 kg/m2           Blood Pressure from Last 3 Encounters:   04/02/18 130/80   03/26/18 (!) 161/93   03/26/18 154/78    Weight from Last 3 Encounters:   04/02/18 245 lb (111.1 kg)   03/26/18 245 lb (111.1 kg)   03/23/18 245 lb 12.8 oz (111.5 kg)              Today, you had the following     No orders found for display       Primary Care Provider Office Phone # Fax #    [facility] [phone] [address] [address]        Equal Access to Services     ANTHONY SHORT : Hadii shyam oconnoro Soifrah, waaxda luqadaha, qaybta kaalmada adekaminiyada, dora reyes. So [facility] [phone].    ATENCIÓN: Si habla español, tiene a neil disposición servicios gratuitos de asistencia lingüística. Llame al [phone].    We comply with applicable federal civil rights laws and [place] laws. We do not discriminate on the basis of race, color, national origin, age, disability, sex, sexual orientation, or gender identity.            Thank you!     Thank you for choosing [facility]  for your care. Our goal is always to provide you with excellent care. Hearing back from our patients is one way we can continue to improve our services. Please take a few minutes to complete the written survey that you may receive in the mail after your visit with us. Thank you!             Your Updated Medication List - Protect others around you: Learn how to safely use, store and throw away your medicines at www.disposemymeds.org.          This list is accurate as of 4/2/18  9:15 AM.  Always use your most recent med list.                   Brand Name Dispense Instructions for use Diagnosis    cetirizine 10 MG tablet    zyrTEC    90 tablet    TAKE 1 TABLET(10 MG) BY MOUTH DAILY    Environmental allergies

## 2018-04-17 ENCOUNTER — TRANSFERRED RECORDS (OUTPATIENT)
Dept: HEALTH INFORMATION MANAGEMENT | Facility: CLINIC | Age: 53
End: 2018-04-17

## 2018-04-17 LAB
HPV ABSTRACT: NORMAL
PAP SMEAR - HIM PATIENT REPORTED: NEGATIVE

## 2018-05-02 ENCOUNTER — TRANSFERRED RECORDS (OUTPATIENT)
Dept: HEALTH INFORMATION MANAGEMENT | Facility: CLINIC | Age: 53
End: 2018-05-02

## 2018-05-09 ENCOUNTER — TRANSFERRED RECORDS (OUTPATIENT)
Dept: HEALTH INFORMATION MANAGEMENT | Facility: CLINIC | Age: 53
End: 2018-05-09

## 2018-08-11 ENCOUNTER — HEALTH MAINTENANCE LETTER (OUTPATIENT)
Age: 53
End: 2018-08-11

## 2018-10-19 DIAGNOSIS — Z91.09 ENVIRONMENTAL ALLERGIES: ICD-10-CM

## 2018-10-19 RX ORDER — CETIRIZINE HYDROCHLORIDE 10 MG/1
TABLET ORAL
Qty: 90 TABLET | Refills: 1 | Status: SHIPPED | OUTPATIENT
Start: 2018-10-19 | End: 2019-04-28

## 2018-10-19 NOTE — TELEPHONE ENCOUNTER
"Requested Prescriptions   Pending Prescriptions Disp Refills     cetirizine (ZYRTEC) 10 MG tablet [Pharmacy Med Name: CETIRIZINE 10MG TABLETS]  Last Written Prescription Date:  9/29/17  Last Fill Quantity: 90,  # refills: 3   Last office visit: 4/2/2018 with prescribing provider:  Shabana Solomon PA-C   Future Office Visit:     90 tablet 0     Sig: TAKE 1 TABLET(10 MG) BY MOUTH DAILY    Antihistamines Protocol Passed    10/19/2018  3:17 AM       Passed - Patient is 3-64 years of age    Apply weight-based dosing for peds patients age 3 - 12 years of age.    Forward request to provider for patients under the age of 3 or over the age of 64.         Passed - Recent (12 mo) or future (30 days) visit within the authorizing provider's specialty    Patient had office visit in the last 12 months or has a visit in the next 30 days with authorizing provider or within the authorizing provider's specialty.  See \"Patient Info\" tab in inbasket, or \"Choose Columns\" in Meds & Orders section of the refill encounter.                "

## 2018-10-19 NOTE — TELEPHONE ENCOUNTER
Prescription approved per [facility] [place] [facility].    Glenn Hood RN

## 2018-10-30 ENCOUNTER — TRANSFERRED RECORDS (OUTPATIENT)
Dept: HEALTH INFORMATION MANAGEMENT | Facility: CLINIC | Age: 53
End: 2018-10-30

## 2019-02-19 ENCOUNTER — HOSPITAL ENCOUNTER (EMERGENCY)
Facility: CLINIC | Age: 54
Discharge: HOME | End: 2019-02-19
Attending: EMERGENCY MEDICINE | Admitting: EMERGENCY MEDICINE
Payer: COMMERCIAL

## 2019-02-19 ENCOUNTER — APPOINTMENT (OUTPATIENT)
Dept: GENERAL RADIOLOGY | Facility: CLINIC | Age: 54
End: 2019-02-19
Attending: EMERGENCY MEDICINE
Payer: COMMERCIAL

## 2019-02-19 VITALS
RESPIRATION RATE: 20 BRPM | BODY MASS INDEX: 41.54 KG/M2 | SYSTOLIC BLOOD PRESSURE: 163 MMHG | OXYGEN SATURATION: 97 % | DIASTOLIC BLOOD PRESSURE: 99 MMHG | HEART RATE: 92 BPM | HEIGHT: 61 IN | TEMPERATURE: 97.4 F | WEIGHT: 220 LBS

## 2019-02-19 DIAGNOSIS — J40 BRONCHITIS: ICD-10-CM

## 2019-02-19 PROCEDURE — 25000125 ZZHC RX 250: Performed by: EMERGENCY MEDICINE

## 2019-02-19 PROCEDURE — 94640 AIRWAY INHALATION TREATMENT: CPT

## 2019-02-19 PROCEDURE — 99283 EMERGENCY DEPT VISIT LOW MDM: CPT | Mod: 25

## 2019-02-19 PROCEDURE — 71046 X-RAY EXAM CHEST 2 VIEWS: CPT

## 2019-02-19 RX ORDER — PREDNISONE 20 MG/1
60 TABLET ORAL ONCE
Status: COMPLETED | OUTPATIENT
Start: 2019-02-19 | End: 2019-02-19

## 2019-02-19 RX ORDER — ALBUTEROL SULFATE 90 UG/1
2 AEROSOL, METERED RESPIRATORY (INHALATION) EVERY 6 HOURS PRN
Qty: 1 INHALER | Refills: 0 | Status: SHIPPED | OUTPATIENT
Start: 2019-02-19 | End: 2019-05-17

## 2019-02-19 RX ORDER — PREDNISONE 20 MG/1
TABLET ORAL
Qty: 10 TABLET | Refills: 0 | Status: SHIPPED | OUTPATIENT
Start: 2019-02-19 | End: 2019-05-17

## 2019-02-19 RX ORDER — IPRATROPIUM BROMIDE AND ALBUTEROL SULFATE 2.5; .5 MG/3ML; MG/3ML
6 SOLUTION RESPIRATORY (INHALATION) ONCE
Status: COMPLETED | OUTPATIENT
Start: 2019-02-19 | End: 2019-02-19

## 2019-02-19 RX ADMIN — PREDNISONE 60 MG: 20 TABLET ORAL at 05:20

## 2019-02-19 RX ADMIN — IPRATROPIUM BROMIDE AND ALBUTEROL SULFATE 6 ML: .5; 3 SOLUTION RESPIRATORY (INHALATION) at 05:20

## 2019-02-19 ASSESSMENT — ENCOUNTER SYMPTOMS
SLEEP DISTURBANCE: 1
COUGH: 1
WHEEZING: 1

## 2019-02-19 ASSESSMENT — MIFFLIN-ST. JEOR: SCORE: 1545.29

## 2019-02-19 NOTE — ED TRIAGE NOTES
Cough and congestion since Wednesday, was diagnose with bronchitis past Wednesday. Was on mucinex and dayquil/nyquil. Trouble sleeping due to cough/congestion. Was getting better on Saturday, but then started getting worst. Also having lower back pain, worst with coughing. ABCs intact.

## 2019-02-19 NOTE — ED PROVIDER NOTES
History     Chief Complaint:  Cough      HPI   Dalila Roman is a 53 year old female who presents with her  for evaluation of cough and congestion symptoms since Wednesday, this being Tuesday morning. The patient reports that she was Diagnosed with bronchitis and sent home with instructions to take Mucinex as well as tessalon to take to help her sleep which at this point had been very interrupted due to her symptoms. She did not seem to improve on Thursday or Friday. She states that on Saturday she finally seemed to be improving. The patient was even able to rest. However she reports that things seemed to change this morning when at around 2100 she started coughing again. She reports that the cough is deep and continuous. She has not been able to sleep and notes that she even had an episode of vomiting from all the coughing. She also notes that she currently has a wheeze even when she is not coughing. She denies a history of COPD. She denies any chest pain when she is not coughing.     Allergies:  Codeine Sulfate      Medications:    Zyrtec     Past Medical History:    History reviewed. No pertinent past medical history.    Past Surgical History:    C Section surgery  Endometrial sampling.  [place] teeth extraction  Right foot surgery  Carpal tunnel     Family History:    Diabetes  MI  Hypothyroidism    Social History:  The patient  reports that she has quit smoking. she has never used smokeless tobacco. She reports that she drinks alcohol. She reports that she does not use drugs.   Marital Status:   [2]     Review of Systems   Respiratory: Positive for cough and wheezing.    Cardiovascular: Negative for chest pain.   Psychiatric/Behavioral: Positive for sleep disturbance.   All other systems reviewed and are negative.    Physical Exam     Vital signs  Patient Vitals for the past 24 hrs:   BP Temp Temp src Pulse Heart Rate Resp SpO2 Height Weight   02/19/19 0624 (!) 163/99 -- -- 92 -- 20 97 % -- --  "  02/19/19 0451 (!) 192/102 97.4  F (36.3  C) Oral 77 77 18 96 % 1.549 m (5' 1\") 99.8 kg (220 lb)          Physical Exam  VS: Reviewed per above  HENT: Mucous membranes moist  EYES: sclera anicteric  CV: Rate as noted, regular rhythm.   RESP: Effort normal. subtle exp wheezing BL, coughing intermittently on exam  NEURO: Alert, moving all extremities  MSK: No deformity of the extremities  SKIN: Warm and dry      Emergency Department Course   Imaging:  XR Chest 2 Views   Final Result   IMPRESSION: No acute abnormality.      ALLEN MENSAH MD        Results as read by radiology.   I communicated the results of the imaging studies with the patient who expressed understanding of these findings.      Interventions:  0512: Albuterol 2.5mg/3 mL inhalation solution, 6 mg, inhalation   1512: Deltasone 60 mg PO      Emergency Department Course:  Past medical records, nursing notes, and vitals reviewed.  0506: I performed an exam of the patient and obtained history, as documented above.       The patient was sent for a CXR while in the emergency department, findings above.     0624: Rechecked the patient, findings and plan explained to the patient. Patient discharged home, status improved, with instructions regarding supportive care, medications, and reasons to return as well as the importance of close follow-up was reviewed.    Impression & Plan      Medical Decision Making:  Patient presents to the ER for evaluation of cough, wheezing.  Initial vital signs notable for blood pressure 192/102.  Exam is notable for subtle expiratory wheezing bilaterally, coughing frequently on exam.  Given prolonged period of severe cough, chest x-ray was obtained.  This was notable for no evidence of pneumonia, pneumothorax, other acute process.  Due to wheezing, DuoNeb was given as well as prednisone due to concern for bronchospasm.  Plan to discharge with inhaler and prednisone burst.  Instructed her to follow-up in the clinic setting.  " Close return precautions were discussed.    Diagnosis:    ICD-10-CM    1. Bronchitis J40        Disposition:  discharged to home    Discharge Medications:     Medication List      Started    albuterol 108 (90 Base) MCG/ACT inhaler  Commonly known as:  PROAIR HFA/PROVENTIL HFA/VENTOLIN HFA  2 puffs, Inhalation, EVERY 6 HOURS PRN     predniSONE 20 MG tablet  Commonly known as:  DELTASONE  Take two tablets (= 40mg) each day for 5 (five) days          Tiera CURRY am serving as a scribe at 6:14 AM on 2/19/2019 to document services personally performed by Alan Padilla MD based on my observations and the provider's statements to me.    [facility] EMERGENCY DEPARTMENT       Alan Padilla MD  02/19/19 0702

## 2019-02-19 NOTE — ED AVS SNAPSHOT
[facility] Emergency Department  [address]  [facility] [address]  Phone:  [phone]  Fax:  [phone]                                    Dalila Roman   MRN: [id number]    Department:  [facility] Emergency Department   Date of Visit:  2/19/2019           After Visit Summary Signature Page    I have received my discharge instructions, and my questions have been answered. I have discussed any challenges I see with this plan with the nurse or doctor.    ..........................................................................................................................................  Patient/Patient Representative Signature      ..........................................................................................................................................  Patient Representative Print Name and Relationship to Patient    ..................................................               ................................................  Date                                   Time    ..........................................................................................................................................  Reviewed by Signature/Title    ...................................................              ..............................................  Date                                               Time          22EPIC Rev 08/18

## 2019-05-17 ENCOUNTER — OFFICE VISIT (OUTPATIENT)
Dept: FAMILY MEDICINE | Facility: CLINIC | Age: 54
End: 2019-05-17
Payer: COMMERCIAL

## 2019-05-17 VITALS
RESPIRATION RATE: 16 BRPM | WEIGHT: 251.9 LBS | HEART RATE: 64 BPM | SYSTOLIC BLOOD PRESSURE: 164 MMHG | DIASTOLIC BLOOD PRESSURE: 104 MMHG | TEMPERATURE: 97.7 F | BODY MASS INDEX: 47.56 KG/M2 | HEIGHT: 61 IN

## 2019-05-17 DIAGNOSIS — M25.562 CHRONIC PAIN OF LEFT KNEE: Primary | ICD-10-CM

## 2019-05-17 DIAGNOSIS — R03.0 ELEVATED BLOOD PRESSURE READING WITHOUT DIAGNOSIS OF HYPERTENSION: ICD-10-CM

## 2019-05-17 DIAGNOSIS — G89.29 CHRONIC PAIN OF LEFT KNEE: Primary | ICD-10-CM

## 2019-05-17 DIAGNOSIS — E66.01 MORBID OBESITY, UNSPECIFIED OBESITY TYPE (H): ICD-10-CM

## 2019-05-17 DIAGNOSIS — Z13.220 SCREENING FOR LIPID DISORDERS: ICD-10-CM

## 2019-05-17 LAB
ALBUMIN SERPL-MCNC: 4 G/DL (ref 3.4–5)
ALP SERPL-CCNC: 114 U/L (ref 40–150)
ALT SERPL W P-5'-P-CCNC: 38 U/L (ref 0–50)
ANION GAP SERPL CALCULATED.3IONS-SCNC: 9 MMOL/L (ref 3–14)
AST SERPL W P-5'-P-CCNC: 18 U/L (ref 0–45)
BILIRUB SERPL-MCNC: 0.6 MG/DL (ref 0.2–1.3)
BUN SERPL-MCNC: 18 MG/DL (ref 7–30)
CALCIUM SERPL-MCNC: 9.6 MG/DL (ref 8.5–10.1)
CHLORIDE SERPL-SCNC: 107 MMOL/L (ref 94–109)
CHOLEST SERPL-MCNC: 223 MG/DL
CO2 SERPL-SCNC: 26 MMOL/L (ref 20–32)
CREAT SERPL-MCNC: 0.77 MG/DL (ref 0.52–1.04)
ERYTHROCYTE [DISTWIDTH] IN BLOOD BY AUTOMATED COUNT: 12.8 % (ref 10–15)
GFR SERPL CREATININE-BSD FRML MDRD: 88 ML/MIN/{1.73_M2}
GLUCOSE SERPL-MCNC: 103 MG/DL (ref 70–99)
HCT VFR BLD AUTO: 42.4 % (ref 35–47)
HDLC SERPL-MCNC: 74 MG/DL
HGB BLD-MCNC: 14.2 G/DL (ref 11.7–15.7)
LDLC SERPL CALC-MCNC: 138 MG/DL
MCH RBC QN AUTO: 30.9 PG (ref 26.5–33)
MCHC RBC AUTO-ENTMCNC: 33.5 G/DL (ref 31.5–36.5)
MCV RBC AUTO: 92 FL (ref 78–100)
NONHDLC SERPL-MCNC: 149 MG/DL
PLATELET # BLD AUTO: 315 10E9/L (ref 150–450)
POTASSIUM SERPL-SCNC: 4.3 MMOL/L (ref 3.4–5.3)
PROT SERPL-MCNC: 8.1 G/DL (ref 6.8–8.8)
RBC # BLD AUTO: 4.6 10E12/L (ref 3.8–5.2)
SODIUM SERPL-SCNC: 142 MMOL/L (ref 133–144)
TRIGL SERPL-MCNC: 56 MG/DL
TSH SERPL DL<=0.005 MIU/L-ACNC: 2.53 MU/L (ref 0.4–4)
WBC # BLD AUTO: 7.2 10E9/L (ref 4–11)

## 2019-05-17 PROCEDURE — 99214 OFFICE O/P EST MOD 30 MIN: CPT | Performed by: PHYSICIAN ASSISTANT

## 2019-05-17 PROCEDURE — 80061 LIPID PANEL: CPT | Performed by: PHYSICIAN ASSISTANT

## 2019-05-17 PROCEDURE — 84443 ASSAY THYROID STIM HORMONE: CPT | Performed by: PHYSICIAN ASSISTANT

## 2019-05-17 PROCEDURE — 36415 COLL VENOUS BLD VENIPUNCTURE: CPT | Performed by: PHYSICIAN ASSISTANT

## 2019-05-17 PROCEDURE — 85027 COMPLETE CBC AUTOMATED: CPT | Performed by: PHYSICIAN ASSISTANT

## 2019-05-17 PROCEDURE — 80053 COMPREHEN METABOLIC PANEL: CPT | Performed by: PHYSICIAN ASSISTANT

## 2019-05-17 RX ORDER — BACLOFEN 20 MG
1 TABLET ORAL EVERY OTHER DAY
COMMUNITY
End: 2024-06-04

## 2019-05-17 RX ORDER — CHLORAL HYDRATE 500 MG
2 CAPSULE ORAL EVERY OTHER DAY
COMMUNITY
End: 2024-06-04

## 2019-05-17 RX ORDER — MULTIVIT-MIN/IRON/FOLIC ACID/K 18-600-40
1 CAPSULE ORAL DAILY
COMMUNITY

## 2019-05-17 ASSESSMENT — MIFFLIN-ST. JEOR: SCORE: 1689.99

## 2019-05-17 NOTE — NURSING NOTE
HM: [facility], she will call to schedule.  Elen Rogers CMA ([facility])

## 2019-05-17 NOTE — PROGRESS NOTES
SUBJECTIVE:   Dalila Roman is a 53 year old female who presents to clinic today for the following   health issues:      Musculoskeletal problem/pain      Duration: just over one month    Description  Location: left knee    Intensity:  Moderate-severe    Accompanying signs and symptoms: hearing clicking and popping noises, has swelling, pain radiates up into left hip and into left foot, weakness of leg, some shakiness (possibly nerves)    History  Previous similar problem: no   Previous evaluation:  none    Precipitating or alleviating factors:  Trauma or overuse: no , unknown cx  Aggravating factors include: fast walking, climbing stairs and putting pressure on one leg, sometimes hurts when laying in bed when not laying on that leg    Therapies tried and outcome: ice, Ibuprofen and elevation; patient says icing and elevation has helped    Patient is here today for evaluation of left knee pain  Ongoing for 1 month  Started bothering her when sitting on ground, has to keep leg straight because if bent it locks up  She also notes clicking, popping and catching  No known injury  Taking Ibuprofen and icing knee without relief of pain      Patient is fasting today, wants to know if she can do blood work for physical, has scheduled 6/3/19    Additional history: as documented    Reviewed  and updated as needed this visit by clinical staff  Tobacco  Allergies  Meds  Med Hx  Surg Hx  Fam Hx  Soc Hx        Reviewed and updated as needed this visit by Provider         Patient Active Problem List   Diagnosis     CARDIOVASCULAR SCREENING; LDL GOAL LESS THAN 160     Environmental allergies     Morbid obesity, unspecified obesity type (H)     Elevated blood pressure reading without diagnosis of hypertension     Past Surgical History:   Procedure Laterality Date      SECTION      twice,  and      ENDOMETRIAL SAMPLING (BIOPSY)  2017    negative; done for DUB     ENT SURGERY      wisdom tooth extraction  "    ORTHOPEDIC SURGERY  1994    right foot     SOFT TISSUE SURGERY      carpel tunnel       Social History     Tobacco Use     Smoking status: Former Smoker     Smokeless tobacco: Never Used   Substance Use Topics     Alcohol use: Yes     Alcohol/week: 0.0 oz     Comment: wine, belén sometimes, casual     Family History   Problem Relation Age of Onset     Diabetes Mother      Breast Cancer Mother         early 40's     Hypertension Mother      Heart Disease Father         MI     Cancer - colorectal Brother         1 brother - 38     Diabetes Brother         2 brothers     Heart Disease Brother         1 brother had MI s/p triple by pass     Thyroid Disease Daughter 19        Hypothyroidism         Current Outpatient Medications   Medication Sig Dispense Refill     cetirizine (ZYRTEC) 10 MG tablet TAKE 1 TABLET(10 MG) BY MOUTH DAILY 30 tablet 0     fish oil-omega-3 fatty acids 1000 MG capsule Take 2 g by mouth every other day       Magnesium Oxide 500 MG TABS Take 1 tablet by mouth every other day       Vitamin D, Cholecalciferol, 1000 units TABS Take 1 tablet by mouth daily       Allergies   Allergen Reactions     Codeine Sulfate Other (See Comments)     Hallucinations       ROS:  Constitutional, HEENT, cardiovascular, pulmonary, gi and gu systems are negative, except as otherwise noted.    OBJECTIVE:     BP (!) 162/98 (BP Location: Right arm, Patient Position: Chair, Cuff Size: Adult Large)   Pulse 64   Temp 97.7  F (36.5  C) (Oral)   Resp 16   Ht 1.549 m (5' 1\")   Wt 114.3 kg (251 lb 14.4 oz)   LMP 11/01/2018 (Approximate)   Breastfeeding? No   BMI 47.60 kg/m    Body mass index is 47.6 kg/m .  GENERAL: healthy, alert and no distress  NECK: no adenopathy, no asymmetry, masses, or scars and thyroid normal to palpation  RESP: lungs clear to auscultation - no rales, rhonchi or wheezes  CV: regular rate and rhythm, normal S1 S2, no S3 or S4, no murmur, click or rub, no peripheral edema and peripheral pulses " strong  MS: LLE exam shows normal strength and muscle mass, no deformities, no erythema, induration, or nodules and + pain with Lorri, crepitus present    Diagnostic Test Results:  none     ASSESSMENT/PLAN:     1. Chronic pain of left knee  New problem, suspect mensicus tear.  Recommend conservative treatment with ice and NSAIDs.  Advised MRI to further assess.  - MR Knee Left w/o Contrast; Future    2. Morbid obesity, unspecified obesity type (H)  - Comprehensive metabolic panel  - TSH with free T4 reflex  - CBC with platelets    3. Screening for lipid disorders  - Lipid panel reflex to direct LDL Fasting    4. Elevated blood pressure reading without diagnosis of hypertension  Chronic issue, recommend DASH diet and weight loss.  Patient declines medication, will address at upcoming visit.      Risks, benefits and alternatives were discussed with patient. Agreeable to the plan of care.    Shabana Solomon PA-C  [facility]

## 2019-05-21 ENCOUNTER — TELEPHONE (OUTPATIENT)
Dept: FAMILY MEDICINE | Facility: CLINIC | Age: 54
End: 2019-05-21

## 2019-05-21 NOTE — TELEPHONE ENCOUNTER
Reason for Call:  Other call back    Detailed comments: Pt hasn't received a call to set up her MRI yet. Also pt is wondering with all of the bloodwork she had done, if she even needs to do a Physical upcoming? Please advise on all aspects of this.    Phone Number Patient can be reached at: Home number on file [phone] (home)    Best Time: any    Can we leave a detailed message on this number? YES    Call taken on 5/21/2019 at 11:43 AM by Saira Toro

## 2019-05-21 NOTE — TELEPHONE ENCOUNTER
Called the pt back.      Gave number to call to schedule MRI.  Last physical was 6/13/16.  Advised would be a good idea to have her physical.

## 2019-05-22 ENCOUNTER — HOSPITAL ENCOUNTER (OUTPATIENT)
Dept: MRI IMAGING | Facility: CLINIC | Age: 54
Discharge: HOME | End: 2019-05-22
Attending: PHYSICIAN ASSISTANT | Admitting: PHYSICIAN ASSISTANT
Payer: COMMERCIAL

## 2019-05-22 DIAGNOSIS — M25.562 CHRONIC PAIN OF LEFT KNEE: ICD-10-CM

## 2019-05-22 DIAGNOSIS — G89.29 CHRONIC PAIN OF LEFT KNEE: ICD-10-CM

## 2019-05-22 PROCEDURE — 73721 MRI JNT OF LWR EXTRE W/O DYE: CPT | Mod: LT

## 2019-05-28 ENCOUNTER — TELEPHONE (OUTPATIENT)
Dept: FAMILY MEDICINE | Facility: CLINIC | Age: 54
End: 2019-05-28

## 2019-05-28 ENCOUNTER — MYC MEDICAL ADVICE (OUTPATIENT)
Dept: FAMILY MEDICINE | Facility: CLINIC | Age: 54
End: 2019-05-28

## 2019-05-28 NOTE — TELEPHONE ENCOUNTER
Attempted to call patient, unable to LM.  Sent [employer] message with results.  Elen Rogers CMA ([facility])

## 2019-05-28 NOTE — TELEPHONE ENCOUNTER
Please call patient:     She has a moderate tear of her meniscus, recommend follow up with Ortho.   Referral has been placed, they will reach out to her to schedule appt.   In interim, recommend rest, ice and ibuprofen use for pain.     Shabana Solomon PA-C

## 2019-05-31 ENCOUNTER — TRANSFERRED RECORDS (OUTPATIENT)
Dept: HEALTH INFORMATION MANAGEMENT | Facility: CLINIC | Age: 54
End: 2019-05-31

## 2019-06-10 NOTE — PATIENT INSTRUCTIONS
CONTRAVE  PHENTERMINE          Preventive Health Recommendations  Female Ages 50 - 64    Yearly exam: See your health care provider every year in order to  o Review health changes.   o Discuss preventive care.    o Review your medicines if your doctor has prescribed any.      Get a Pap test every three years (unless you have an abnormal result and your provider advises testing more often).    If you get Pap tests with HPV test, you only need to test every 5 years, unless you have an abnormal result.     You do not need a Pap test if your uterus was removed (hysterectomy) and you have not had cancer.    You should be tested each year for STDs (sexually transmitted diseases) if you're at risk.     Have a mammogram every 1 to 2 years.    Have a colonoscopy at age 50, or have a yearly FIT test (stool test). These exams screen for colon cancer.      Have a cholesterol test every 5 years, or more often if advised.    Have a diabetes test (fasting glucose) every three years. If you are at risk for diabetes, you should have this test more often.     If you are at risk for osteoporosis (brittle bone disease), think about having a bone density scan (DEXA).    Shots: Get a flu shot each year. Get a tetanus shot every 10 years.    Nutrition:     Eat at least 5 servings of fruits and vegetables each day.    Eat whole-grain bread, whole-wheat pasta and brown rice instead of white grains and rice.    Get adequate Calcium and Vitamin D.     Lifestyle    Exercise at least 150 minutes a week (30 minutes a day, 5 days a week). This will help you control your weight and prevent disease.    Limit alcohol to one drink per day.    No smoking.     Wear sunscreen to prevent skin cancer.     See your dentist every six months for an exam and cleaning.    See your eye doctor every 1 to 2 years.

## 2019-06-10 NOTE — PROGRESS NOTES
SUBJECTIVE:   CC: Dalila Roman is an 53 year old woman who presents for preventive health visit.     Patient is fasting: NO, had labs done in May  Wants to review results    Healthy Habits:     Getting at least 3 servings of Calcium per day:  Yes    Bi-annual eye exam:  Yes    Dental care twice a year:  NO    Sleep apnea or symptoms of sleep apnea:  None    Diet:  Low salt    Frequency of exercise:  1 day/week    Duration of exercise:  15-30 minutes    Taking medications regularly:  Yes    Barriers to taking medications:  None    Medication side effects:  Not applicable    PHQ-2 Total Score: 0    Additional concerns today:  No      Today's PHQ-2 Score:   PHQ-2 (  [employer]) 2019   Q1: Little interest or pleasure in doing things 0   Q2: Feeling down, depressed or hopeless 0   PHQ-2 Score 0   Q1: Little interest or pleasure in doing things Not at all   Q2: Feeling down, depressed or hopeless Not at all   PHQ-2 Score 0     Abuse: Current or Past(Physical, Sexual or Emotional)- No  Do you feel safe in your environment? Yes    Social History     Tobacco Use     Smoking status: Former Smoker     Smokeless tobacco: Never Used   Substance Use Topics     Alcohol use: Yes     Alcohol/week: 0.0 oz     Comment: wine, belén sometimes, casual       Alcohol Use 2019   Prescreen: >3 drinks/day or >7 drinks/week? No   Prescreen: >3 drinks/day or >7 drinks/week? -       Reviewed orders with patient.  Reviewed health maintenance and updated orders accordingly - Yes  Patient Active Problem List   Diagnosis     CARDIOVASCULAR SCREENING; LDL GOAL LESS THAN 160     Environmental allergies     Morbid obesity, unspecified obesity type (H)     Elevated blood pressure reading without diagnosis of hypertension     Past Surgical History:   Procedure Laterality Date      SECTION      twice,  and      ENDOMETRIAL SAMPLING (BIOPSY)  2017    negative; done for DUB     ENT SURGERY      wisdom tooth extraction      ORTHOPEDIC SURGERY  1994    right foot     SOFT TISSUE SURGERY      carpel tunnel       Social History     Tobacco Use     Smoking status: Former Smoker     Smokeless tobacco: Never Used   Substance Use Topics     Alcohol use: Yes     Alcohol/week: 0.0 oz     Comment: wine, belén sometimes, casual     Family History   Problem Relation Age of Onset     Diabetes Mother      Breast Cancer Mother         early 40's     Hypertension Mother      Heart Disease Father         MI     Cancer - colorectal Brother         1 brother - 38     Diabetes Brother         2 brothers     Heart Disease Brother         1 brother had MI s/p triple by pass     Thyroid Disease Daughter 19        Hypothyroidism         Current Outpatient Medications   Medication Sig Dispense Refill     cetirizine (ZYRTEC) 10 MG tablet TAKE 1 TABLET(10 MG) BY MOUTH DAILY 30 tablet 0     fish oil-omega-3 fatty acids 1000 MG capsule Take 2 g by mouth every other day       Magnesium Oxide 500 MG TABS Take 1 tablet by mouth every other day       Vitamin D, Cholecalciferol, 1000 units TABS Take 1 tablet by mouth daily       Allergies   Allergen Reactions     Codeine Sulfate Other (See Comments)     Hallucinations       Mammogram Screening: Patient over age 50, mutual decision to screen reflected in health maintenance.    Pertinent mammograms are reviewed under the imaging tab.  History of abnormal Pap smear: NO - age 30-65 PAP every 5 years with negative HPV co-testing recommended     Reviewed and updated as needed this visit by clinical staff  Tobacco  Allergies  Meds  Med Hx  Surg Hx  Fam Hx  Soc Hx        Reviewed and updated as needed this visit by Provider            Review of Systems   Constitutional: Negative for chills and fever.   HENT: Negative for congestion, ear pain, hearing loss and sore throat.    Eyes: Negative for pain and visual disturbance.   Respiratory: Negative for cough and shortness of breath.    Cardiovascular: Positive for  "peripheral edema. Negative for chest pain and palpitations.   Gastrointestinal: Negative for abdominal pain, constipation, diarrhea, heartburn, hematochezia and nausea.   Breasts:  Negative for tenderness, breast mass and discharge.   Genitourinary: Negative for dysuria, frequency, genital sores, hematuria, pelvic pain, urgency, vaginal bleeding and vaginal discharge.   Musculoskeletal: Positive for myalgias. Negative for arthralgias and joint swelling.   Skin: Negative for rash.   Neurological: Negative for dizziness, weakness, headaches and paresthesias.   Psychiatric/Behavioral: Negative for mood changes. The patient is not nervous/anxious.         OBJECTIVE:   /76 (BP Location: Right arm, Patient Position: Chair, Cuff Size: Adult Large)   Pulse 67   Temp 97.8  F (36.6  C) (Oral)   Resp 16   Ht 1.549 m (5' 1\")   Wt 112.9 kg (248 lb 14.4 oz)   LMP 11/01/2018 (Approximate)   SpO2 99%   Breastfeeding? No   BMI 47.03 kg/m    Physical Exam  GENERAL: healthy, alert and no distress  EYES: Eyes grossly normal to inspection, PERRL and conjunctivae and sclerae normal  HENT: ear canals and TM's normal, nose and mouth without ulcers or lesions  NECK: no adenopathy, no asymmetry, masses, or scars and thyroid normal to palpation  RESP: lungs clear to auscultation - no rales, rhonchi or wheezes  BREAST: deferred  CV: regular rate and rhythm, normal S1 S2, no S3 or S4, no murmur, click or rub, no peripheral edema and peripheral pulses strong  ABDOMEN: soft, nontender, no hepatosplenomegaly, no masses and bowel sounds normal   : deferred  MS: no gross musculoskeletal defects noted, no edema  SKIN: no suspicious lesions or rashes  NEURO: Normal strength and tone, mentation intact and speech normal  PSYCH: mentation appears normal, affect normal/bright    Diagnostic Test Results:  Labs reviewed in Epic    ASSESSMENT/PLAN:   1. Routine general medical examination at a health care facility  Reviewed labs with " "patient.  Will get OB/GYN records, including mammogram.    2. Morbid obesity, unspecified obesity type (H)  Discussed her weight, interested in weight loss medication, referral to Endocrine given.  - ENDOCRINOLOGY ADULT REFERRAL    3. Environmental allergies  Chronic issue, stable with Zyrtec refill given.  - cetirizine (ZYRTEC) 10 MG tablet; Take 1 tablet (10 mg) by mouth daily  Dispense: 90 tablet; Refill: 3    4. Chronic pain of left knee  Chronic issue, saw [facility], would like second opinion, referral given.  - ORTHO  REFERRAL    COUNSELING:  Reviewed preventive health counseling, as reflected in patient instructions    Estimated body mass index is 47.03 kg/m  as calculated from the following:    Height as of this encounter: 1.549 m (5' 1\").    Weight as of this encounter: 112.9 kg (248 lb 14.4 oz).    Weight management plan: Discussed healthy diet and exercise guidelines     reports that she has quit smoking. She has never used smokeless tobacco.      Counseling Resources:  ATP IV Guidelines  Pooled Cohorts Equation Calculator  Breast Cancer Risk Calculator  FRAX Risk Assessment  ICSI Preventive Guidelines  Dietary Guidelines for Americans, 2010  USDA's MyPlate  ASA Prophylaxis  Lung CA Screening    Shabana Solomon PA-C  [facility]  "

## 2019-06-11 ENCOUNTER — OFFICE VISIT (OUTPATIENT)
Dept: FAMILY MEDICINE | Facility: CLINIC | Age: 54
End: 2019-06-11
Payer: COMMERCIAL

## 2019-06-11 VITALS
DIASTOLIC BLOOD PRESSURE: 76 MMHG | HEART RATE: 67 BPM | RESPIRATION RATE: 16 BRPM | HEIGHT: 61 IN | TEMPERATURE: 97.8 F | WEIGHT: 248.9 LBS | OXYGEN SATURATION: 99 % | BODY MASS INDEX: 46.99 KG/M2 | SYSTOLIC BLOOD PRESSURE: 128 MMHG

## 2019-06-11 DIAGNOSIS — Z00.00 ROUTINE GENERAL MEDICAL EXAMINATION AT A HEALTH CARE FACILITY: Primary | ICD-10-CM

## 2019-06-11 DIAGNOSIS — G89.29 CHRONIC PAIN OF LEFT KNEE: ICD-10-CM

## 2019-06-11 DIAGNOSIS — E66.01 MORBID OBESITY, UNSPECIFIED OBESITY TYPE (H): ICD-10-CM

## 2019-06-11 DIAGNOSIS — M25.562 CHRONIC PAIN OF LEFT KNEE: ICD-10-CM

## 2019-06-11 DIAGNOSIS — Z91.09 ENVIRONMENTAL ALLERGIES: ICD-10-CM

## 2019-06-11 PROCEDURE — 99396 PREV VISIT EST AGE 40-64: CPT | Performed by: PHYSICIAN ASSISTANT

## 2019-06-11 RX ORDER — CETIRIZINE HYDROCHLORIDE 10 MG/1
10 TABLET ORAL DAILY
Qty: 90 TABLET | Refills: 3 | Status: SHIPPED | OUTPATIENT
Start: 2019-06-11

## 2019-06-11 ASSESSMENT — ENCOUNTER SYMPTOMS
HEMATURIA: 0
PALPITATIONS: 0
CHILLS: 0
FREQUENCY: 0
COUGH: 0
DIZZINESS: 0
PARESTHESIAS: 0
MYALGIAS: 1
SHORTNESS OF BREATH: 0
HEMATOCHEZIA: 0
WEAKNESS: 0
HEADACHES: 0
DIARRHEA: 0
ARTHRALGIAS: 0
ABDOMINAL PAIN: 0
HEARTBURN: 0
NERVOUS/ANXIOUS: 0
CONSTIPATION: 0
DYSURIA: 0
EYE PAIN: 0
SORE THROAT: 0
JOINT SWELLING: 0
FEVER: 0
NAUSEA: 0
BREAST MASS: 0

## 2019-06-11 ASSESSMENT — MIFFLIN-ST. JEOR: SCORE: 1676.38

## 2019-06-11 NOTE — NURSING NOTE
HM: patient will schedule mammo with [facility].  Declines [place], says she will ask if she wants it.  Elen Rogers CMA (AAMA)

## 2019-06-18 ENCOUNTER — DOCUMENTATION ONLY (OUTPATIENT)
Dept: FAMILY MEDICINE | Facility: CLINIC | Age: 54
End: 2019-06-18

## 2019-06-18 NOTE — PROGRESS NOTES
Recd records from [facility] 06/18/19 and forwarded to Shabana OWUSU for review and scanning

## 2019-06-20 ENCOUNTER — MYC MEDICAL ADVICE (OUTPATIENT)
Dept: FAMILY MEDICINE | Facility: CLINIC | Age: 54
End: 2019-06-20

## 2019-06-26 ENCOUNTER — OFFICE VISIT (OUTPATIENT)
Dept: ORTHOPEDICS | Facility: CLINIC | Age: 54
End: 2019-06-26
Payer: COMMERCIAL

## 2019-06-26 VITALS
WEIGHT: 248 LBS | DIASTOLIC BLOOD PRESSURE: 86 MMHG | BODY MASS INDEX: 46.82 KG/M2 | SYSTOLIC BLOOD PRESSURE: 142 MMHG | HEIGHT: 61 IN

## 2019-06-26 DIAGNOSIS — M17.12 PRIMARY OSTEOARTHRITIS OF LEFT KNEE: Primary | ICD-10-CM

## 2019-06-26 DIAGNOSIS — S83.222A PERIPHERAL TEAR OF MEDIAL MENISCUS OF LEFT KNEE AS CURRENT INJURY, INITIAL ENCOUNTER: ICD-10-CM

## 2019-06-26 PROCEDURE — 99204 OFFICE O/P NEW MOD 45 MIN: CPT | Performed by: FAMILY MEDICINE

## 2019-06-26 RX ORDER — MELOXICAM 15 MG/1
15 TABLET ORAL DAILY
Qty: 30 TABLET | Refills: 1 | Status: SHIPPED | OUTPATIENT
Start: 2019-06-26 | End: 2021-01-14

## 2019-06-26 ASSESSMENT — MIFFLIN-ST. JEOR: SCORE: 1672.3

## 2019-06-26 NOTE — LETTER
6/26/2019         RE: Dalila Roman  [address]  [place] [address]        Dear Colleague,    Thank you for referring your patient, Dalila Roman, to the [facility]. Please see a copy of my visit note below.    ASSESSMENT & PLAN  Patient Instructions     1. Primary osteoarthritis of left knee    2. Peripheral tear of medial meniscus of left knee as current injury, initial encounter      -Patient has left knee pain due to a medial meniscus tear and arthritis.  -MRI results of the left knee was reviewed with patient.  All questions answered.  -Patient will start formal physical therapy and home exercise program.  -Patient was given the treatment options of physical therapy versus oral anti-inflammatories versus a cortisone injection.  Patient was also advised that a meniscus surgery at this time would not be helpful since it would worsen her arthritis pain would be just a temporary pain reliever.  -She will start meloxicam 15 mg p.o. daily and icing.  -Patient will follow-up in 4 weeks for reevaluation and progression of activity.  If no improvement to consider draining her knee and giving a cortisone injection.  -Dalila to follow up with Primary Care provider regarding elevated blood pressure.    -Call direct clinic number [[phone]] at any time with questions or concerns.    Albert Yeo MD [facility]  [facility]          -----    SUBJECTIVE  Dalila Roman is a/an 53 year old female who is seen in consultation at the request of  Shabana Solomon PA-C for evaluation of left knee pain. The patient is seen by themselves.    Onset: 2-3 month(s) ago. Reports insidious onset without acute precipitating event.  Location of Pain: left medial and posterior knee, left calf, left distal anterior thigh  Rating of Pain at worst: 7/10  Rating of Pain Currently: 2/10  Worsened by: walking, knee flexion, squatting, kneeling,  going up and down stairs  Better with: rest/activity avoidance, ice, ibuprofen  Treatments tried: rest/activity avoidance, ice, ibuprofen and previous imaging (MRI 5/22/19 @ [facility] and xray 5/31/19 @ [facility])  Associated symptoms: swelling and locking or catching  Orthopedic history: NO  Relevant surgical history: NO  Social history: social history: works in [employer] work (patient states she stands more than she sits throughout her day)    History reviewed. No pertinent past medical history.  Social History     Socioeconomic History     Marital status:      Spouse name: Not on file     Number of children: Not on file     Years of education: Not on file     Highest education level: Not on file   Occupational History     Not on file   Social Needs     Financial resource strain: Not on file     Food insecurity:     Worry: Not on file     Inability: Not on file     Transportation needs:     Medical: Not on file     Non-medical: Not on file   Tobacco Use     Smoking status: Former Smoker     Smokeless tobacco: Never Used   Substance and Sexual Activity     Alcohol use: Yes     Alcohol/week: 0.0 oz     Comment: wine, belén sometimes, casual     Drug use: No     Sexual activity: Yes     Partners: Male     Birth control/protection: None   Lifestyle     Physical activity:     Days per week: Not on file     Minutes per session: Not on file     Stress: Not on file   Relationships     Social connections:     Talks on phone: Not on file     Gets together: Not on file     Attends [employer] service: Not on file     Active member of club or organization: Not on file     Attends meetings of clubs or organizations: Not on file     Relationship status: Not on file     Intimate partner violence:     Fear of current or ex partner: Not on file     Emotionally abused: Not on file     Physically abused: Not on file     Forced sexual activity: Not on file   Other Topics Concern     Parent/sibling w/ CABG, MI or angioplasty before 65F  "55M? Not Asked   Social History Narrative     Not on file         Patient's past medical, surgical, social, and family histories were reviewed today and no changes are noted.    REVIEW OF SYSTEMS:  10 point ROS is negative other than symptoms noted above in HPI, Past Medical History or as stated below  Constitutional: NEGATIVE for fever, chills, change in weight  Skin: NEGATIVE for worrisome rashes, moles or lesions  GI/: NEGATIVE for bowel or bladder changes  Neuro: NEGATIVE for weakness, dizziness or paresthesias    OBJECTIVE:  /86   Ht 1.549 m (5' 1\")   Wt 112.5 kg (248 lb)   BMI 46.86 kg/m      General: healthy, alert and in no distress  HEENT: no scleral icterus or conjunctival erythema  Skin: no suspicious lesions or rash. No jaundice.  CV: no pedal edema  Resp: normal respiratory effort without conversational dyspnea   Psych: normal mood and affect  Gait: normal steady gait with appropriate coordination and balance  Neuro: Normal light sensory exam of lower extremity  MSK:  LEFT KNEE  Inspection:    normal alignment  Palpation:    Tender about the lateral patellar facet, lateral joint line and medial joint line. Remainder of bony and ligamentous landmarks are nontender.    Moderate effusion is present    Patellofemoral crepitus is Present  Range of Motion:     00 extension to 1200 flexion  Strength:    Quadriceps 5-/5 and hamstrings 5-/5    Extensor mechanism intact  Special Tests:    Positive: Lorri's    Negative: MCL/valgus stress (0 & 30 deg), LCL/varus stress (0 & 30 deg), Lachman's, anterior drawer, posterior drawer    Independent visualization of the below image:  No results found for this or any previous visit (from the past 24 hour(s)).  Results for orders placed or performed during the hospital encounter of 05/22/19   MR Knee Left w/o Contrast    Narrative    MR KNEE LEFT WITHOUT CONTRAST   5/22/2019 5:16 PM    HISTORY:  Six weeks of left knee pain. The concern is for a " meniscus  tear.    COMPARISON: None.    TECHNIQUE: Multiplanar MR imaging was performed without contrast.    FINDINGS:     Medial Meniscus: There is a moderate-sized horizontal tear of the  posterior horn contacting the inferior articular surface. No displaced  meniscal fragment is seen.    Lateral Meniscus: No tear, displaced fragment, or extrusion.    Anterior Cruciate Ligament: Unremarkable.    Posterior Cruciate Ligament: Unremarkable.    Medial Collateral Ligament: Unremarkable.    Lateral Collateral Ligament Complex, Popliteus Tendon: The iliotibial  band, fibular collateral ligament, biceps femoris tendon, and  popliteus tendon are unremarkable.    Osseous and Cartilaginous Structures: No fracture or osseous lesion is  demonstrated. There is prominent grade 3 and possibly some grade 4  chondromalacia within the lateral patellar facet with moderate  underlying marrow edema superiorly. There is a small grade 3 chondral  fissure in the central to posterior weightbearing portion of the  lateral femoral condyle. The cartilage of the medial compartment is  well preserved.    Extensor Mechanism: The quadriceps and patellar tendons are  unremarkable. The medial and lateral patellar retinacula appear  unremarkable.    Joint Space: Small joint effusion. No definite loose bodies  appreciated.    Additional Findings: There is a Baker's cyst measuring 7.8 cm  craniocaudal x 4.2 cm AP x 2.1 cm transverse. No  semimembranosus-tibial collateral ligament or pes anserine bursitis.  No soft tissue pathology is seen.      Impression    IMPRESSION:   1. Moderately prominent tear of the posterior horn of the medial  meniscus.  2. Prominent chondromalacia of the lateral patellar facet and to a  lesser degree, the lateral femoral condyle.  3. Small joint effusion and a prominent Baker's cyst.    BRADLEY KRANENDONK, MD Albert Yeo MD [facility]      Again, thank you for allowing me to participate  in the care of your patient.        Sincerely,        Albert Yeo, MD

## 2019-06-26 NOTE — PROGRESS NOTES
ASSESSMENT & PLAN  Patient Instructions     1. Primary osteoarthritis of left knee    2. Peripheral tear of medial meniscus of left knee as current injury, initial encounter      -Patient has left knee pain due to a medial meniscus tear and arthritis.  -MRI results of the left knee was reviewed with patient.  All questions answered.  -Patient will start formal physical therapy and home exercise program.  -Patient was given the treatment options of physical therapy versus oral anti-inflammatories versus a cortisone injection.  Patient was also advised that a meniscus surgery at this time would not be helpful since it would worsen her arthritis pain would be just a temporary pain reliever.  -She will start meloxicam 15 mg p.o. daily and icing.  -Patient will follow-up in 4 weeks for reevaluation and progression of activity.  If no improvement to consider draining her knee and giving a cortisone injection.  -Dalila to follow up with Primary Care provider regarding elevated blood pressure.    -Call direct clinic number [[phone]] at any time with questions or concerns.    Albert Yeo MD [facility]  [facility]          -----    SUBJECTIVE  Dalila Roman is a/an 53 year old female who is seen in consultation at the request of  Shabana Solomon PA-C for evaluation of left knee pain. The patient is seen by themselves.    Onset: 2-3 month(s) ago. Reports insidious onset without acute precipitating event.  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 No pertinent past medical history.  Social History     Socioeconomic History     Marital status:      Spouse name: Not on file     Number of children: Not on file     Years of education: Not on file     Highest education level: Not on file   Occupational History     Not on file   Social Needs     Financial resource strain: Not on file     Food insecurity:     Worry: Not on file     Inability: Not on file     Transportation needs:     Medical: Not on file     Non-medical: Not on file   Tobacco Use     Smoking status: Former Smoker     Smokeless tobacco: Never Used   Substance and Sexual Activity     Alcohol use: Yes     Alcohol/week: 0.0 oz     Comment: wine, belén sometimes, casual     Drug use: No     Sexual activity: Yes     Partners: Male     Birth control/protection: None   Lifestyle     Physical activity:     Days per week: Not on file     Minutes per session: Not on file     Stress: Not on file   Relationships     Social connections:     Talks on phone: Not on file     Gets together: Not on file     Attends Sabianist service: Not on file     Active member of club or organization: Not on file     Attends meetings of clubs or organizations: Not on file     Relationship status: Not on file     Intimate partner violence:     Fear of current or ex partner: Not on file     Emotionally abused: Not on file     Physically abused: Not on file     Forced sexual activity: Not on file   Other Topics Concern     Parent/sibling w/ CABG, MI or angioplasty before 65F 55M? Not Asked   Social History Narrative     Not on file         Patient's past medical, surgical, social, and family histories were reviewed today and no changes are noted.    REVIEW OF SYSTEMS:  10 point ROS is negative other than symptoms noted  "above in HPI, Past Medical History or as stated below  Constitutional: NEGATIVE for fever, chills, change in weight  Skin: NEGATIVE for worrisome rashes, moles or lesions  GI/: NEGATIVE for bowel or bladder changes  Neuro: NEGATIVE for weakness, dizziness or paresthesias    OBJECTIVE:  /86   Ht 1.549 m (5' 1\")   Wt 112.5 kg (248 lb)   BMI 46.86 kg/m     General: healthy, alert and in no distress  HEENT: no scleral icterus or conjunctival erythema  Skin: no suspicious lesions or rash. No jaundice.  CV: no pedal edema  Resp: normal respiratory effort without conversational dyspnea   Psych: normal mood and affect  Gait: normal steady gait with appropriate coordination and balance  Neuro: Normal light sensory exam of lower extremity  MSK:  LEFT KNEE  Inspection:    normal alignment  Palpation:    Tender about the lateral patellar facet, lateral joint line and medial joint line. Remainder of bony and ligamentous landmarks are nontender.    Moderate effusion is present    Patellofemoral crepitus is Present  Range of Motion:     00 extension to 1200 flexion  Strength:    Quadriceps 5-/5 and hamstrings 5-/5    Extensor mechanism intact  Special Tests:    Positive: Lorri's    Negative: MCL/valgus stress (0 & 30 deg), LCL/varus stress (0 & 30 deg), Lachman's, anterior drawer, posterior drawer    Independent visualization of the below image:  No results found for this or any previous visit (from the past 24 hour(s)).  Results for orders placed or performed during the hospital encounter of 05/22/19   MR Knee Left w/o Contrast    Narrative    MR KNEE LEFT WITHOUT CONTRAST   5/22/2019 5:16 PM    HISTORY:  Six weeks of left knee pain. The concern is for a meniscus  tear.    COMPARISON: None.    TECHNIQUE: Multiplanar MR imaging was performed without contrast.    FINDINGS:     Medial Meniscus: There is a moderate-sized horizontal tear of the  posterior horn contacting the inferior articular surface. No " displaced  meniscal fragment is seen.    Lateral Meniscus: No tear, displaced fragment, or extrusion.    Anterior Cruciate Ligament: Unremarkable.    Posterior Cruciate Ligament: Unremarkable.    Medial Collateral Ligament: Unremarkable.    Lateral Collateral Ligament Complex, Popliteus Tendon: The iliotibial  band, fibular collateral ligament, biceps femoris tendon, and  popliteus tendon are unremarkable.    Osseous and Cartilaginous Structures: No fracture or osseous lesion is  demonstrated. There is prominent grade 3 and possibly some grade 4  chondromalacia within the lateral patellar facet with moderate  underlying marrow edema superiorly. There is a small grade 3 chondral  fissure in the central to posterior weightbearing portion of the  lateral femoral condyle. The cartilage of the medial compartment is  well preserved.    Extensor Mechanism: The quadriceps and patellar tendons are  unremarkable. The medial and lateral patellar retinacula appear  unremarkable.    Joint Space: Small joint effusion. No definite loose bodies  appreciated.    Additional Findings: There is a Baker's cyst measuring 7.8 cm  craniocaudal x 4.2 cm AP x 2.1 cm transverse. No  semimembranosus-tibial collateral ligament or pes anserine bursitis.  No soft tissue pathology is seen.      Impression    IMPRESSION:   1. Moderately prominent tear of the posterior horn of the medial  meniscus.  2. Prominent chondromalacia of the lateral patellar facet and to a  lesser degree, the lateral femoral condyle.  3. Small joint effusion and a prominent Baker's cyst.    BRADLEY KRANENDONK, MD Albert Yeo MD [facility]

## 2019-06-26 NOTE — PATIENT INSTRUCTIONS
1. Primary osteoarthritis of left knee    2. Peripheral tear of medial meniscus of left knee as current injury, initial encounter      -Patient has left knee pain due to a medial meniscus tear and arthritis.  -MRI results of the left knee was reviewed with patient.  All questions answered.  -Patient will start formal physical therapy and home exercise program.  -Patient was given the treatment options of physical therapy versus oral anti-inflammatories versus a cortisone injection.  Patient was also advised that a meniscus surgery at this time would not be helpful since it would worsen her arthritis pain would be just a temporary pain reliever.  -She will start meloxicam 15 mg p.o. daily and icing.  -Patient will follow-up in 4 weeks for reevaluation and progression of activity.  If no improvement to consider draining her knee and giving a cortisone injection.  -Dalila to follow up with Primary Care provider regarding elevated blood pressure.    -Call direct clinic number [[phone]] at any time with questions or concerns.    Albert Yeo MD CAQSM  [facility]  [facility]

## 2019-11-18 ENCOUNTER — TELEPHONE (OUTPATIENT)
Dept: FAMILY MEDICINE | Facility: CLINIC | Age: 54
End: 2019-11-18

## 2019-11-18 ENCOUNTER — MYC MEDICAL ADVICE (OUTPATIENT)
Dept: FAMILY MEDICINE | Facility: CLINIC | Age: 54
End: 2019-11-18

## 2019-11-18 NOTE — LETTER
December 2, 2019      Dalila Roman  [address]  [place] [address]        Dear Ms. Dalila Roman,      We care about your health and have reviewed your health plan including medical conditions, medications, and lab results.  Based on this review, we recommend you take the following action(s):     -schedule a MAMMOGRAM.  This is an important screening for breast cancer.  Please disregard this reminder if you have had this exam elsewhere within the last year.  It would be helpful for us to receive a copy of these results so we can update your records.   To schedule a mammogram, please call our services at [phone].     -schedule a NURSE ONLY APPOINTMENT for a FLU VACCINE.  This is an appointment that does not involve a provider.  The needs you currently have can be handled by one of our nursing staff and is much quicker than a regular office visit.         Please use [employer] or call us at [phone] to address the above recommendations.   Thank you for trusting [facility] and we appreciate the opportunity to serve you.  We look forward to supporting your healthcare needs in the future.     Sincerely,   Elen Rogers CMA ([facility])

## 2019-11-18 NOTE — TELEPHONE ENCOUNTER
Type of outreach:  [employer] message.  Health Maintenance Due   Topic Date Due     HIV SCREENING  10/08/1981     PNEUMOCOCCAL IMMUNIZATION 19-64 MEDIUM RISK (1 of 1 - PPSV23) 10/08/1985     ZOSTER IMMUNIZATION (1 of 2) 10/08/2016     MAMMO SCREENING  06/09/2018     INFLUENZA VACCINE (1) 09/01/2019     Due for mammo, flu vaccine.  Elen Rogers CMA ([facility])

## 2019-12-02 NOTE — TELEPHONE ENCOUNTER
Type of outreach:  Sent letter.  Health Maintenance Due   Topic Date Due     HIV SCREENING  10[birth year]08/1981     PNEUMOCOCCAL IMMUNIZATION 19-64 MEDIUM RISK (1 of 1 - PPSV23) 10/08/1985     ZOSTER IMMUNIZATION (1 of 2) 10/08/2016     MAMMO SCREENING  06/09/2018     INFLUENZA VACCINE (1) 09/01/2019     3rd attempt, due for mammo and flu vaccine.  Elen Rogers CMA ([facility])

## 2019-12-09 ENCOUNTER — ALLIED HEALTH/NURSE VISIT (OUTPATIENT)
Dept: FAMILY MEDICINE | Facility: CLINIC | Age: 54
End: 2019-12-09
Payer: COMMERCIAL

## 2019-12-09 ENCOUNTER — HEALTH MAINTENANCE LETTER (OUTPATIENT)
Age: 54
End: 2019-12-09

## 2019-12-09 VITALS — SYSTOLIC BLOOD PRESSURE: 130 MMHG | DIASTOLIC BLOOD PRESSURE: 82 MMHG

## 2019-12-09 DIAGNOSIS — Z01.30 BP CHECK: Primary | ICD-10-CM

## 2019-12-09 PROCEDURE — 99207 ZZC NO CHARGE NURSE ONLY: CPT | Performed by: PHYSICIAN ASSISTANT

## 2019-12-09 NOTE — PROGRESS NOTES
Dalila Roman was evaluated at [facility] on December 9, 2019 at which time her blood pressure was:    BP Readings from Last 3 Encounters:   12/09/19 130/82   06/26/19 142/86   06/11/19 128/76     Pulse Readings from Last 3 Encounters:   06/11/19 67   05/17/19 64   02/19/19 92       Reviewed lifestyle modifications for blood pressure control and reduction: including making healthy food choices, managing weight, getting regular exercise, smoking cessation, reducing alcohol consumption, monitoring blood pressure regularly.     Symptoms: None    BP Goal:< 140/90 mmHg    BP Assessment:  BP at goal    Potential Reasons for BP too high: NA - Not applicable    BP Follow-Up Plan: Recheck BP in 6 months at pharmacy    Recommendation to Provider: none    Note completed by: Aleksander Wang    Thank You   Victoriano Brady [facility]

## 2020-02-07 ENCOUNTER — HOSPITAL ENCOUNTER (OUTPATIENT)
Dept: MAMMOGRAPHY | Facility: CLINIC | Age: 55
Discharge: HOME | End: 2020-02-07
Attending: PHYSICIAN ASSISTANT | Admitting: PHYSICIAN ASSISTANT
Payer: COMMERCIAL

## 2020-02-07 DIAGNOSIS — Z12.31 VISIT FOR SCREENING MAMMOGRAM: ICD-10-CM

## 2020-02-07 PROCEDURE — 77063 BREAST TOMOSYNTHESIS BI: CPT

## 2020-02-11 ENCOUNTER — HOSPITAL ENCOUNTER (OUTPATIENT)
Dept: ULTRASOUND IMAGING | Facility: CLINIC | Age: 55
Discharge: HOME | End: 2020-02-11
Attending: PHYSICIAN ASSISTANT | Admitting: PHYSICIAN ASSISTANT
Payer: COMMERCIAL

## 2020-02-11 DIAGNOSIS — R92.8 ABNORMAL MAMMOGRAM: ICD-10-CM

## 2020-02-11 PROCEDURE — 76642 ULTRASOUND BREAST LIMITED: CPT | Mod: LT

## 2020-02-12 ENCOUNTER — TELEPHONE (OUTPATIENT)
Dept: FAMILY MEDICINE | Facility: CLINIC | Age: 55
End: 2020-02-12

## 2020-02-12 DIAGNOSIS — R92.8 ABNORMAL MAMMOGRAM: Primary | ICD-10-CM

## 2020-02-28 ENCOUNTER — OFFICE VISIT (OUTPATIENT)
Dept: FAMILY MEDICINE | Facility: CLINIC | Age: 55
End: 2020-02-28
Payer: COMMERCIAL

## 2020-02-28 VITALS
WEIGHT: 228.1 LBS | HEART RATE: 84 BPM | BODY MASS INDEX: 43.1 KG/M2 | TEMPERATURE: 97.6 F | SYSTOLIC BLOOD PRESSURE: 138 MMHG | OXYGEN SATURATION: 97 % | DIASTOLIC BLOOD PRESSURE: 88 MMHG

## 2020-02-28 DIAGNOSIS — H92.03 OTALGIA OF BOTH EARS: ICD-10-CM

## 2020-02-28 DIAGNOSIS — H93.13 TINNITUS, BILATERAL: ICD-10-CM

## 2020-02-28 DIAGNOSIS — H69.93 DYSFUNCTION OF BOTH EUSTACHIAN TUBES: Primary | ICD-10-CM

## 2020-02-28 PROCEDURE — 99214 OFFICE O/P EST MOD 30 MIN: CPT | Performed by: PHYSICIAN ASSISTANT

## 2020-02-28 NOTE — PROGRESS NOTES
"  Subjective     Dalila Roman is a 54 year old female who presents to clinic today for the following health issues:    HPI   Ear problem      Duration: 2 weeks    Description  ear pain bilateral    Severity: moderate    Accompanying signs and symptoms: None    History (predisposing factors):  none    Precipitating or alleviating factors: None    Therapies tried and outcome:  none    2 weeks with persistent pain in both ears. Reports ongoing mild to moderate discomfort with occasional episodes of more severe pain. States it \"feels like hot pencil is in the ear\" at times. Also has had some ringing in the ears when the pain is increased. Reports that she notices some hearing loss occasionally. No dizziness, headache, fever, sore throat, nasal congestion, cough, chest pain, shortness of breath. Does have allergies but doesn't feel like they've been bad recently, takes zyrtec. No abdominal pain, nausea, vomiting, diarrhea, urinary symptoms. Has otherwise been feeling well recently, no recent illness. Has not tried anything for her symptoms. No history of similar ear problems.    Patient Active Problem List   Diagnosis     CARDIOVASCULAR SCREENING; LDL GOAL LESS THAN 160     Environmental allergies     Morbid obesity, unspecified obesity type (H)     Elevated blood pressure reading without diagnosis of hypertension     Past Surgical History:   Procedure Laterality Date      SECTION      twice,  and      ENDOMETRIAL SAMPLING (BIOPSY)  2017    negative; done for DUB     ENT SURGERY      wisdom tooth extraction     ORTHOPEDIC SURGERY      right foot     SOFT TISSUE SURGERY      carpel tunnel       Social History     Tobacco Use     Smoking status: Former Smoker     Smokeless tobacco: Never Used   Substance Use Topics     Alcohol use: Yes     Alcohol/week: 0.0 standard drinks     Comment: wine, belén sometimes, casual     Family History   Problem Relation Age of Onset     Diabetes Mother      " Breast Cancer Mother         early 40's     Hypertension Mother      Heart Disease Father         MI     Cancer - colorectal Brother         1 brother - 38     Diabetes Brother         2 brothers     Heart Disease Brother         1 brother had MI s/p triple by pass     Thyroid Disease Daughter 19        Hypothyroidism         Current Outpatient Medications   Medication Sig Dispense Refill     cetirizine (ZYRTEC) 10 MG tablet Take 1 tablet (10 mg) by mouth daily 90 tablet 3     fish oil-omega-3 fatty acids 1000 MG capsule Take 2 g by mouth every other day       Magnesium Oxide 500 MG TABS Take 1 tablet by mouth every other day       meloxicam (MOBIC) 15 MG tablet Take 1 tablet (15 mg) by mouth daily 30 tablet 1     Vitamin D, Cholecalciferol, 1000 units TABS Take 1 tablet by mouth daily       Allergies   Allergen Reactions     Codeine Sulfate Other (See Comments)     Hallucinations       Reviewed and updated as needed this visit by Provider  Tobacco  Allergies  Meds  Problems  Med Hx  Surg Hx  Fam Hx         Review of Systems   ROS COMP: Constitutional, HEENT, cardiovascular, pulmonary, gi and gu systems are negative, except as otherwise noted.      Objective    /88   Pulse 84   Temp 97.6  F (36.4  C) (Oral)   Wt 103.5 kg (228 lb 1.6 oz)   SpO2 97%   BMI 43.10 kg/m    Body mass index is 43.1 kg/m .  Physical Exam   GENERAL: healthy, alert and no distress  EYES: Eyes grossly normal to inspection, PERRL and conjunctivae and sclerae normal  HENT: normal cephalic/atraumatic, both ears: slight clear effusion but otherwise canals and TMs appear normal, nasal mucosa edematous, oropharynx clear and oral mucous membranes moist  NECK: no adenopathy, no asymmetry, masses, or scars  RESP: lungs clear to auscultation - no rales, rhonchi or wheezes  CV: regular rate and rhythm  Diagnostic Test Results: None         Assessment & Plan     1. Dysfunction of both eustachian tubes  - OTOLARYNGOLOGY REFERRAL  Suspect  there may be an element of eustachian tube dysfunction. Recommend flonase and continue zyrtec. Referred to ENT for further evaluation due to significant ear discomfort and ringing in the ears along with some occasional feelings of hearing loss. No dizziness to suggest Meniere disease. No headache. No fever or signs of infection. Recommend further evaluation with ENT. Risks and benefits of treatment plan discussed. Patient and/or parent acknowledges and agrees with plan of care, all questions answered.      2. Otalgia of both ears  - OTOLARYNGOLOGY REFERRAL  See above    3. Tinnitus, bilateral  - OTOLARYNGOLOGY REFERRAL  See above    Return in about 4 months (around 6/28/2020) for Preventive Physical Exam.    Destiny Shah PA-C  [facility]

## 2020-02-28 NOTE — PATIENT INSTRUCTIONS
I suspect you may have eustachian tube dysfunction and would recommend flonase nasal spray - 1 puff each nostril twice daily. Due to the significant discomfort with ringing in the ears I recommend follow-up with ENT for further evaluation and treatment, referral placed today.

## 2020-08-11 ENCOUNTER — HOSPITAL ENCOUNTER (OUTPATIENT)
Dept: ULTRASOUND IMAGING | Facility: CLINIC | Age: 55
Discharge: HOME | End: 2020-08-11
Attending: PHYSICIAN ASSISTANT | Admitting: PHYSICIAN ASSISTANT
Payer: COMMERCIAL

## 2020-08-11 DIAGNOSIS — R92.8 ABNORMAL MAMMOGRAM: Primary | ICD-10-CM

## 2020-08-11 DIAGNOSIS — R92.8 ABNORMAL MAMMOGRAM: ICD-10-CM

## 2020-08-11 PROCEDURE — 76642 ULTRASOUND BREAST LIMITED: CPT | Mod: LT

## 2020-08-28 ENCOUNTER — TELEPHONE (OUTPATIENT)
Dept: FAMILY MEDICINE | Facility: CLINIC | Age: 55
End: 2020-08-28

## 2020-08-28 DIAGNOSIS — Z00.00 ROUTINE GENERAL MEDICAL EXAMINATION AT A HEALTH CARE FACILITY: Primary | ICD-10-CM

## 2020-08-28 NOTE — TELEPHONE ENCOUNTER
Reason for call:  Order   Order or referral being requested: labs  Reason for request: pt has appt on Spt 25 with BRET Shah and wants to know if she can come in for the labs beforehand.  Please call her and let her know. If not she will fast for her appt.   Date needed: as soon as possible  Has the patient been seen by the PCP for this problem? NO    Additional comments: see above    Phone number to reach patient:  Cell number on file:    Telephone Information:   Mobile [phone]       Best Time:  any    Can we leave a detailed message on this number?  YES    Travel screening: Not Applicable

## 2020-08-28 NOTE — TELEPHONE ENCOUNTER
Routing to [facility] to see if labs can be ordered before appt or does patient need to wait.

## 2020-09-15 DIAGNOSIS — Z00.00 ROUTINE GENERAL MEDICAL EXAMINATION AT A HEALTH CARE FACILITY: ICD-10-CM

## 2020-09-15 LAB
ALBUMIN SERPL-MCNC: 3.6 G/DL (ref 3.4–5)
ALP SERPL-CCNC: 104 U/L (ref 40–150)
ALT SERPL W P-5'-P-CCNC: 32 U/L (ref 0–50)
ANION GAP SERPL CALCULATED.3IONS-SCNC: 5 MMOL/L (ref 3–14)
AST SERPL W P-5'-P-CCNC: 16 U/L (ref 0–45)
BILIRUB SERPL-MCNC: 0.6 MG/DL (ref 0.2–1.3)
BUN SERPL-MCNC: 17 MG/DL (ref 7–30)
CALCIUM SERPL-MCNC: 9.1 MG/DL (ref 8.5–10.1)
CHLORIDE SERPL-SCNC: 106 MMOL/L (ref 94–109)
CHOLEST SERPL-MCNC: 205 MG/DL
CO2 SERPL-SCNC: 25 MMOL/L (ref 20–32)
CREAT SERPL-MCNC: 0.76 MG/DL (ref 0.52–1.04)
ERYTHROCYTE [DISTWIDTH] IN BLOOD BY AUTOMATED COUNT: 12.9 % (ref 10–15)
GFR SERPL CREATININE-BSD FRML MDRD: 89 ML/MIN/{1.73_M2}
GLUCOSE SERPL-MCNC: 96 MG/DL (ref 70–99)
HCT VFR BLD AUTO: 40.9 % (ref 35–47)
HDLC SERPL-MCNC: 93 MG/DL
HGB BLD-MCNC: 13.4 G/DL (ref 11.7–15.7)
LDLC SERPL CALC-MCNC: 101 MG/DL
MCH RBC QN AUTO: 30.8 PG (ref 26.5–33)
MCHC RBC AUTO-ENTMCNC: 32.8 G/DL (ref 31.5–36.5)
MCV RBC AUTO: 94 FL (ref 78–100)
NONHDLC SERPL-MCNC: 112 MG/DL
PLATELET # BLD AUTO: 280 10E9/L (ref 150–450)
POTASSIUM SERPL-SCNC: 4.7 MMOL/L (ref 3.4–5.3)
PROT SERPL-MCNC: 7.5 G/DL (ref 6.8–8.8)
RBC # BLD AUTO: 4.35 10E12/L (ref 3.8–5.2)
SODIUM SERPL-SCNC: 136 MMOL/L (ref 133–144)
TRIGL SERPL-MCNC: 54 MG/DL
TSH SERPL DL<=0.005 MIU/L-ACNC: 1.69 MU/L (ref 0.4–4)
WBC # BLD AUTO: 6.5 10E9/L (ref 4–11)

## 2020-09-15 PROCEDURE — 85027 COMPLETE CBC AUTOMATED: CPT | Performed by: PHYSICIAN ASSISTANT

## 2020-09-15 PROCEDURE — 80053 COMPREHEN METABOLIC PANEL: CPT | Performed by: PHYSICIAN ASSISTANT

## 2020-09-15 PROCEDURE — 80061 LIPID PANEL: CPT | Performed by: PHYSICIAN ASSISTANT

## 2020-09-15 PROCEDURE — 84443 ASSAY THYROID STIM HORMONE: CPT | Performed by: PHYSICIAN ASSISTANT

## 2020-09-15 PROCEDURE — 36415 COLL VENOUS BLD VENIPUNCTURE: CPT | Performed by: PHYSICIAN ASSISTANT

## 2020-09-25 ENCOUNTER — OFFICE VISIT (OUTPATIENT)
Dept: FAMILY MEDICINE | Facility: CLINIC | Age: 55
End: 2020-09-25
Payer: COMMERCIAL

## 2020-09-25 VITALS
WEIGHT: 214 LBS | SYSTOLIC BLOOD PRESSURE: 132 MMHG | TEMPERATURE: 98.1 F | BODY MASS INDEX: 40.4 KG/M2 | HEIGHT: 61 IN | DIASTOLIC BLOOD PRESSURE: 88 MMHG | OXYGEN SATURATION: 97 % | HEART RATE: 76 BPM

## 2020-09-25 DIAGNOSIS — E66.01 MORBID OBESITY, UNSPECIFIED OBESITY TYPE (H): ICD-10-CM

## 2020-09-25 DIAGNOSIS — G56.02 CARPAL TUNNEL SYNDROME OF LEFT WRIST: ICD-10-CM

## 2020-09-25 DIAGNOSIS — Z00.00 ROUTINE GENERAL MEDICAL EXAMINATION AT A HEALTH CARE FACILITY: Primary | ICD-10-CM

## 2020-09-25 PROCEDURE — 90715 TDAP VACCINE 7 YRS/> IM: CPT | Performed by: PHYSICIAN ASSISTANT

## 2020-09-25 PROCEDURE — 99213 OFFICE O/P EST LOW 20 MIN: CPT | Mod: 25 | Performed by: PHYSICIAN ASSISTANT

## 2020-09-25 PROCEDURE — 90471 IMMUNIZATION ADMIN: CPT | Performed by: PHYSICIAN ASSISTANT

## 2020-09-25 PROCEDURE — 99396 PREV VISIT EST AGE 40-64: CPT | Mod: 25 | Performed by: PHYSICIAN ASSISTANT

## 2020-09-25 RX ORDER — FLUTICASONE PROPIONATE 50 MCG
1 SPRAY, SUSPENSION (ML) NASAL DAILY
COMMUNITY

## 2020-09-25 ASSESSMENT — ENCOUNTER SYMPTOMS
HEMATURIA: 0
FEVER: 0
DIARRHEA: 0
HEADACHES: 0
PARESTHESIAS: 1
CONSTIPATION: 0
DIZZINESS: 0
ABDOMINAL PAIN: 0
HEMATOCHEZIA: 0
COUGH: 0
CHILLS: 0
FREQUENCY: 0

## 2020-09-25 ASSESSMENT — MIFFLIN-ST. JEOR: SCORE: 1509.11

## 2020-09-25 NOTE — PROGRESS NOTES
SUBJECTIVE:   CC: Dalila Roman is an 54 year old woman who presents for preventive health visit.     Patient has been advised of split billing requirements and indicates understanding: Yes  Healthy Habits:     Getting at least 3 servings of Calcium per day:  Yes    Bi-annual eye exam:  Yes    Dental care twice a year:  NO (once yearly)    Sleep apnea or symptoms of sleep apnea:  None    Diet:  Regular (no restrictions)    Frequency of exercise:  6-7 days/week    Duration of exercise:  15-30 minutes    Taking medications regularly:  Yes    Medication side effects:  Not applicable    PHQ-2 Total Score: 0    Additional concerns today:  Yes    Carpal tunnel in left wrist. Had carpal tunnel in right wrist surgically repaired maybe 15 or 20 years ago. Has had symptoms in left hand for several years but now more persistent numbness for past 2 years. Numbness/tingling in distribution of median nerve (left thumb, pointer finger, middle finger).    Today's PHQ-2 Score:   PHQ-2 (  [employer]) 2020   Q1: Little interest or pleasure in doing things 0   Q2: Feeling down, depressed or hopeless 0   PHQ-2 Score 0   Q1: Little interest or pleasure in doing things Not at all   Q2: Feeling down, depressed or hopeless Not at all   PHQ-2 Score 0       Abuse: Current or Past (Physical, Sexual or Emotional) - No  Do you feel safe in your environment? Yes      Social History     Tobacco Use     Smoking status: Former Smoker     Last attempt to quit: 1990     Years since quittin.7     Smokeless tobacco: Never Used     Tobacco comment: social smoking in 20s   Substance Use Topics     Alcohol use: Yes     Alcohol/week: 0.0 standard drinks     Comment: wine, belén sometimes, casual     If you drink alcohol do you typically have >3 drinks per day or >7 drinks per week? No    Alcohol Use 2020   Prescreen: >3 drinks/day or >7 drinks/week? No   Prescreen: >3 drinks/day or >7 drinks/week? -       Reviewed orders with patient.   Reviewed health maintenance and updated orders accordingly - Yes  Labs reviewed in EPIC  BP Readings from Last 3 Encounters:   20 132/88   20 138/88   19 130/82    Wt Readings from Last 3 Encounters:   20 97.1 kg (214 lb)   20 103.5 kg (228 lb 1.6 oz)   19 112.5 kg (248 lb)                  Patient Active Problem List   Diagnosis     CARDIOVASCULAR SCREENING; LDL GOAL LESS THAN 160     Environmental allergies     Morbid obesity, unspecified obesity type (H)     Elevated blood pressure reading without diagnosis of hypertension     Past Surgical History:   Procedure Laterality Date      SECTION      twice,  and      ENDOMETRIAL SAMPLING (BIOPSY)  2017    negative; done for DUB     ENT SURGERY      wisdom tooth extraction     ORTHOPEDIC SURGERY      right foot     SOFT TISSUE SURGERY      carpel tunnel       Social History     Tobacco Use     Smoking status: Former Smoker     Last attempt to quit:      Years since quittin.7     Smokeless tobacco: Never Used     Tobacco comment: social smoking in 20s   Substance Use Topics     Alcohol use: Yes     Alcohol/week: 0.0 standard drinks     Comment: wine, belén sometimes, casual     Family History   Problem Relation Age of Onset     Diabetes Mother      Breast Cancer Mother         early 40's     Hypertension Mother      Heart Disease Father         MI     Cancer - colorectal Brother         1 brother - 38     Diabetes Brother         2 brothers     Heart Disease Brother         1 brother had MI s/p triple by pass     Thyroid Disease Daughter 19        Hypothyroidism         Current Outpatient Medications   Medication Sig Dispense Refill     cetirizine (ZYRTEC) 10 MG tablet Take 1 tablet (10 mg) by mouth daily 90 tablet 3     fish oil-omega-3 fatty acids 1000 MG capsule Take 2 g by mouth every other day       fluticasone (FLONASE) 50 MCG/ACT nasal spray Spray 1 spray into both nostrils daily        Magnesium Oxide 500 MG TABS Take 1 tablet by mouth every other day       meloxicam (MOBIC) 15 MG tablet Take 1 tablet (15 mg) by mouth daily 30 tablet 1     Vitamin D, Cholecalciferol, 1000 units TABS Take 1 tablet by mouth daily       Allergies   Allergen Reactions     Codeine Sulfate Other (See Comments)     Hallucinations     Recent Labs   Lab Test 09/15/20  0843 05/17/19  0825  04/19/17  0530 04/18/17  1037  02/18/13  1029   A1C  --   --   --   --   --   --  5.3   * 138*  --  90  --    < > 107   HDL 93 74  --  70  --    < > 53   TRIG 54 56  --  136  --    < > 89   ALT 32 38  --   --  29   < >  --    CR 0.76 0.77   < >  --  0.74   < >  --    GFRESTIMATED 89 88   < >  --  82   < >  --    GFRESTBLACK >90 >90   < >  --  >90   GFR Calc     < >  --    POTASSIUM 4.7 4.3   < >  --  4.0   < >  --    TSH 1.69 2.53  --   --   --    < > 2.17    < > = values in this interval not displayed.        Mammogram Screening: Patient over age 50, mutual decision to screen reflected in health maintenance.    Pertinent mammograms are reviewed under the imaging tab.  History of abnormal Pap smear: NO - age 30-65 PAP every 5 years with negative HPV co-testing recommended     Reviewed and updated as needed this visit by clinical staff  Tobacco  Allergies  Meds  Problems  Med Hx  Surg Hx  Fam Hx  Soc Hx          Reviewed and updated as needed this visit by Provider  Tobacco  Allergies  Meds  Problems  Med Hx  Surg Hx  Fam Hx  Soc Hx             Review of Systems   Constitutional: Negative for chills and fever.   HENT: Negative for congestion.    Eyes: Negative for visual disturbance.   Respiratory: Negative for cough.    Cardiovascular: Negative for chest pain and peripheral edema.   Gastrointestinal: Negative for abdominal pain, constipation, diarrhea and hematochezia.   Breasts:  negative.    Genitourinary: Negative for frequency, hematuria, vaginal bleeding and vaginal discharge.   Skin: Negative for  "rash.   Neurological: Positive for paresthesias (left hand/fingers). Negative for dizziness and headaches.   Psychiatric/Behavioral: Negative for mood changes.        OBJECTIVE:   /88 (BP Location: Right arm, Patient Position: Sitting, Cuff Size: Adult Large)   Pulse 76   Temp 98.1  F (36.7  C) (Oral)   Ht 1.543 m (5' 0.75\")   Wt 97.1 kg (214 lb)   SpO2 97%   BMI 40.77 kg/m    Physical Exam  GENERAL: healthy, alert and no distress  EYES: Eyes grossly normal to inspection, PERRL and conjunctivae and sclerae normal  HENT: ear canals and TM's normal, nose and mouth without ulcers or lesions  NECK: no adenopathy, no asymmetry, masses, or scars and thyroid normal to palpation  RESP: lungs clear to auscultation - no rales, rhonchi or wheezes  BREAST: offered, patient declined, follows with OB/GYN  CV: regular rate and rhythm, normal S1 S2, no S3 or S4, no murmur, click or rub, no peripheral edema and peripheral pulses strong  ABDOMEN: soft, nontender, no hepatosplenomegaly, no masses and bowel sounds normal   (female): offered, patient declined, follows with OB/GYN  MS: no gross musculoskeletal defects noted, no edema  SKIN: no suspicious lesions or rashes  NEURO: Normal strength and tone, mentation intact and speech normal  PSYCH: mentation appears normal, affect normal/bright    Diagnostic Test Results:  Labs reviewed in Epic    ASSESSMENT/PLAN:   1. Routine general medical examination at a health care facility  Reviewed personal and family history. Reviewed age appropriate screenings. Recommended any needed vaccinations. Continue to focus on well balanced diet and exercise. Recommended flu vaccine and shingles vaccine. Declines flu vaccine. Will think about shingles vaccine. Had labs done prior to appointment, reviewed results.  - TDAP, IM (10 - 64 YRS) - Adacel    2. Morbid obesity, unspecified obesity type (H)  Chronic issue, working on diet and exercise. Congratulated on 30 pound weight loss in last " "year.    3. Carpal tunnel syndrome of left wrist  Chronic issue, more bothersome in last 2 years. Did have right wrist carpal tunnel surgery many years ago. Referral placed.  - Orthopedic & Spine  Referral; Future    Patient has been advised of split billing requirements and indicates understanding: Yes  COUNSELING:  Reviewed preventive health counseling, as reflected in patient instructions    Estimated body mass index is 40.77 kg/m  as calculated from the following:    Height as of this encounter: 1.543 m (5' 0.75\").    Weight as of this encounter: 97.1 kg (214 lb).    Weight management plan: Discussed healthy diet and exercise guidelines    She reports that she quit smoking about 30 years ago. She has never used smokeless tobacco.      Counseling Resources:  ATP IV Guidelines  Pooled Cohorts Equation Calculator  Breast Cancer Risk Calculator  BRCA-Related Cancer Risk Assessment: FHS-7 Tool  FRAX Risk Assessment  ICSI Preventive Guidelines  Dietary Guidelines for Americans, 2010  USDA's MyPlate  ASA Prophylaxis  Lung CA Screening    Destiny Shah PA-C  [facility] [place]  "

## 2020-09-30 ENCOUNTER — OFFICE VISIT (OUTPATIENT)
Dept: ORTHOPEDICS | Facility: CLINIC | Age: 55
End: 2020-09-30
Attending: PHYSICIAN ASSISTANT
Payer: COMMERCIAL

## 2020-09-30 VITALS
BODY MASS INDEX: 40.4 KG/M2 | SYSTOLIC BLOOD PRESSURE: 126 MMHG | DIASTOLIC BLOOD PRESSURE: 78 MMHG | WEIGHT: 214 LBS | HEIGHT: 61 IN

## 2020-09-30 DIAGNOSIS — G56.02 CARPAL TUNNEL SYNDROME OF LEFT WRIST: ICD-10-CM

## 2020-09-30 PROCEDURE — 99203 OFFICE O/P NEW LOW 30 MIN: CPT | Performed by: ORTHOPAEDIC SURGERY

## 2020-09-30 ASSESSMENT — MIFFLIN-ST. JEOR: SCORE: 1509.11

## 2020-09-30 NOTE — PROGRESS NOTES
HISTORY OF PRESENT ILLNESS:    Dalila Roman is a 54 year old female who is seen in consultation at the request of Dr. Shah for left carpal tunnel syndrome. Patient is right hand domiant. 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 Onset of carpal tunnel symptoms over the last 2-3 years.    Present symptoms: Patient reports constant numbness of her left thumb, index, and long finger, occasional numbness of the ring finger.  Intermittent pain into the forearm area.  Noticeable weakness of the hand, she is very cautious with lifting and carrying. Increased pain with carrying her grand babies.  Current pain level: 7/10  Treatments tried to this point: night splints, sleeping with tennis ball in hands, and sleeping with arms at her sides.   Orthopedic PMH: Right carpal tunnel release ~15-20 years ago.      History reviewed. No pertinent past medical history.  Negative for diabetes, thyroid disease or vitamin deficiencies    Past Surgical History:   Procedure Laterality Date      SECTION      twice,  and      ENDOMETRIAL SAMPLING (BIOPSY)  2017    negative; done for DUB     ENT SURGERY      wisdom tooth extraction     ORTHOPEDIC SURGERY      right foot     SOFT TISSUE SURGERY      carpel tunnel       Family History   Problem Relation Age of Onset     Diabetes Mother      Breast Cancer Mother         early 40's     Hypertension Mother      Heart Disease Father         MI     Cancer - colorectal Brother         1 brother - 38     Diabetes Brother         2 brothers     Heart Disease Brother         1 brother had MI s/p triple by pass     Thyroid Disease Daughter 19        Hypothyroidism       Social History     Socioeconomic History     Marital status:      Spouse name: Not on file     Number of children: Not on file     Years of education: Not on file     Highest education level: Not on file   Occupational History     Not on file   Social Needs     Financial resource strain: Not on file     Food  insecurity     Worry: Not on file     Inability: Not on file     Transportation needs     Medical: Not on file     Non-medical: Not on file   Tobacco Use     Smoking status: Former Smoker     Last attempt to quit: 1990     Years since quittin.7     Smokeless tobacco: Never Used     Tobacco comment: social smoking in 20s   Substance and Sexual Activity     Alcohol use: Yes     Alcohol/week: 0.0 standard drinks     Comment: wine, belén sometimes, casual     Drug use: No     Sexual activity: Yes     Partners: Male     Birth control/protection: None   Lifestyle     Physical activity     Days per week: Not on file     Minutes per session: Not on file     Stress: Not on file   Relationships     Social connections     Talks on phone: Not on file     Gets together: Not on file     Attends [employer] service: Not on file     Active member of club or organization: Not on file     Attends meetings of clubs or organizations: Not on file     Relationship status: Not on file     Intimate partner violence     Fear of current or ex partner: Not on file     Emotionally abused: Not on file     Physically abused: Not on file     Forced sexual activity: Not on file   Other Topics Concern     Parent/sibling w/ CABG, MI or angioplasty before 65F 55M? Not Asked   Social History Narrative     Not on file       Current Outpatient Medications   Medication Sig Dispense Refill     cetirizine (ZYRTEC) 10 MG tablet Take 1 tablet (10 mg) by mouth daily 90 tablet 3     fish oil-omega-3 fatty acids 1000 MG capsule Take 2 g by mouth every other day       fluticasone (FLONASE) 50 MCG/ACT nasal spray Spray 1 spray into both nostrils daily       Magnesium Oxide 500 MG TABS Take 1 tablet by mouth every other day       Vitamin D, Cholecalciferol, 1000 units TABS Take 1 tablet by mouth daily       meloxicam (MOBIC) 15 MG tablet Take 1 tablet (15 mg) by mouth daily (Patient not taking: Reported on 2020) 30 tablet 1       Allergies   Allergen  "Reactions     Codeine Sulfate Other (See Comments)     Hallucinations       REVIEW OF SYSTEMS:  CONSTITUTIONAL:  NEGATIVE for fever, chills, change in weight  INTEGUMENTARY/SKIN:  NEGATIVE for worrisome rashes, moles or lesions  EYES:  NEGATIVE for vision changes or irritation  ENT/MOUTH:  NEGATIVE for ear, mouth and throat problems  RESP:  NEGATIVE for significant cough or SOB  BREAST:  NEGATIVE for masses, tenderness or discharge  CV:  NEGATIVE for chest pain, palpitations or peripheral edema  GI:  NEGATIVE for nausea, abdominal pain, heartburn, or change in bowel habits  :  Negative   MUSCULOSKELETAL:  See HPI above  NEURO:  Paresthesias of left hand  ENDOCRINE:  NEGATIVE for temperature intolerance, skin/hair changes  HEME/ALLERGY/IMMUNE:  NEGATIVE for bleeding problems  PSYCHIATRIC:  NEGATIVE for changes in mood or affect      PHYSICAL EXAM:  /78 (BP Location: Right arm, Patient Position: Chair, Cuff Size: Adult Large)   Ht 1.543 m (5' 0.75\")   Wt 97.1 kg (214 lb)   BMI 40.77 kg/m    Body mass index is 40.77 kg/m .   GENERAL APPEARANCE: healthy, alert and no distress   HEENT: No apparent thyroid megaly. Clear sclera with normal ocular movement  RESPIRATORY: No labored breathing  SKIN: no suspicious lesions or rashes  NEURO: Normal strength and tone, mentation intact and speech normal  VASCULAR: Good pulses, and capillary refill   LYMPH: no lymphadenopathy   PSYCH:  mentation appears normal and affect normal/bright    MUSCULOSKELETAL:  Not in acute distress  Normal gait  Normal neck movement  Normal shoulder movement  Normal elbow movement  Full strength in both upper extremities  Normal pinching, bilateral  Normal gripping, bilateral  Decreased sensation thumb, index and long, left  Positive Tinel sign, left  Positive Phalen test, left  No thenar eminence atrophy  Intact circulation    Right palm incision is healed well  Full range of motion of the fingers in the right  No focal tenderness at the A1 " pulleys, bilateral      ASSESSMENT:  Chronic left carpal tunnel syndrome  History of right carpal tunnel release      PLAN:  We went over the nature of carpal tunnel syndrome.  She is somewhat familiar with pathophysiology from having had the same problem on the right side.  The nature of the surgery and potential complications, recovery time were thoroughly explained.  We also discussed the choices for anesthesia related to surgery.  She for now feels comfortable of doing the surgery under local anesthesia.  At the time of the transverse carpal ligament release, she will have some sensitivity/discomfort.  Potential risk of infection was informed.  Because of ongoing problem at this point she is interested in pursuing left carpal tunnel release under local anesthesia which will be scheduled according to her convenience.            Imaging Interpretation:   None taken today      Sal Soriano MD  Department of Orthopedic Surgery        Disclaimer: This note consists of symbols derived from keyboarding, dictation and/or voice recognition software. As a result, there may be errors in the script that have gone undetected. Please consider this when interpreting information found in this chart.

## 2020-09-30 NOTE — LETTER
2020         RE: Dalila Roman  [address]  [place] [address]        Dear Colleague,    Thank you for referring your patient, Dalila Roman, to the [facility]. Please see a copy of my visit note below.    HISTORY OF PRESENT ILLNESS:    Dalila Roman is a 54 year old female who is seen in consultation at the request of Dr. Shah for left carpal tunnel syndrome. Patient is right hand domiant. She works in the [employer] office. Onset of carpal tunnel symptoms over the last 2-3 years.    Present symptoms: Patient reports constant numbness of her left thumb, index, and long finger, occasional numbness of the ring finger.  Intermittent pain into the forearm area.  Noticeable weakness of the hand, she is very cautious with lifting and carrying. Increased pain with carrying her grand babies.  Current pain level: 7/10  Treatments tried to this point: night splints, sleeping with tennis ball in hands, and sleeping with arms at her sides.   Orthopedic PMH: Right carpal tunnel release ~15-20 years ago.      History reviewed. No pertinent past medical history.  Negative for diabetes, thyroid disease or vitamin deficiencies    Past Surgical History:   Procedure Laterality Date      SECTION      twice,  and      ENDOMETRIAL SAMPLING (BIOPSY)  2017    negative; done for DUB     ENT SURGERY      wisdom tooth extraction     ORTHOPEDIC SURGERY      right foot     SOFT TISSUE SURGERY      carpel tunnel       Family History   Problem Relation Age of Onset     Diabetes Mother      Breast Cancer Mother         early 40's     Hypertension Mother      Heart Disease Father         MI     Cancer - colorectal Brother         1 brother - 38     Diabetes Brother         2 brothers     Heart Disease Brother         1 brother had MI s/p triple by pass     Thyroid Disease Daughter 19        Hypothyroidism       Social History     Socioeconomic History     Marital status:       Spouse name: Not on file     Number of children: Not on file     Years of education: Not on file     Highest education level: Not on file   Occupational History     Not on file   Social Needs     Financial resource strain: Not on file     Food insecurity     Worry: Not on file     Inability: Not on file     Transportation needs     Medical: Not on file     Non-medical: Not on file   Tobacco Use     Smoking status: Former Smoker     Last attempt to quit:      Years since quittin.7     Smokeless tobacco: Never Used     Tobacco comment: social smoking in 20s   Substance and Sexual Activity     Alcohol use: Yes     Alcohol/week: 0.0 standard drinks     Comment: wine, belén sometimes, casual     Drug use: No     Sexual activity: Yes     Partners: Male     Birth control/protection: None   Lifestyle     Physical activity     Days per week: Not on file     Minutes per session: Not on file     Stress: Not on file   Relationships     Social connections     Talks on phone: Not on file     Gets together: Not on file     Attends [employer] service: Not on file     Active member of club or organization: Not on file     Attends meetings of clubs or organizations: Not on file     Relationship status: Not on file     Intimate partner violence     Fear of current or ex partner: Not on file     Emotionally abused: Not on file     Physically abused: Not on file     Forced sexual activity: Not on file   Other Topics Concern     Parent/sibling w/ CABG, MI or angioplasty before 65F 55M? Not Asked   Social History Narrative     Not on file       Current Outpatient Medications   Medication Sig Dispense Refill     cetirizine (ZYRTEC) 10 MG tablet Take 1 tablet (10 mg) by mouth daily 90 tablet 3     fish oil-omega-3 fatty acids 1000 MG capsule Take 2 g by mouth every other day       fluticasone (FLONASE) 50 MCG/ACT nasal spray Spray 1 spray into both nostrils daily       Magnesium Oxide 500 MG TABS Take 1 tablet by mouth  "every other day       Vitamin D, Cholecalciferol, 1000 units TABS Take 1 tablet by mouth daily       meloxicam (MOBIC) 15 MG tablet Take 1 tablet (15 mg) by mouth daily (Patient not taking: Reported on 9/30/2020) 30 tablet 1       Allergies   Allergen Reactions     Codeine Sulfate Other (See Comments)     Hallucinations       REVIEW OF SYSTEMS:  CONSTITUTIONAL:  NEGATIVE for fever, chills, change in weight  INTEGUMENTARY/SKIN:  NEGATIVE for worrisome rashes, moles or lesions  EYES:  NEGATIVE for vision changes or irritation  ENT/MOUTH:  NEGATIVE for ear, mouth and throat problems  RESP:  NEGATIVE for significant cough or SOB  BREAST:  NEGATIVE for masses, tenderness or discharge  CV:  NEGATIVE for chest pain, palpitations or peripheral edema  GI:  NEGATIVE for nausea, abdominal pain, heartburn, or change in bowel habits  :  Negative   MUSCULOSKELETAL:  See HPI above  NEURO:  Paresthesias of left hand  ENDOCRINE:  NEGATIVE for temperature intolerance, skin/hair changes  HEME/ALLERGY/IMMUNE:  NEGATIVE for bleeding problems  PSYCHIATRIC:  NEGATIVE for changes in mood or affect      PHYSICAL EXAM:  /78 (BP Location: Right arm, Patient Position: Chair, Cuff Size: Adult Large)   Ht 1.543 m (5' 0.75\")   Wt 97.1 kg (214 lb)   BMI 40.77 kg/m    Body mass index is 40.77 kg/m .   GENERAL APPEARANCE: healthy, alert and no distress   HEENT: No apparent thyroid megaly. Clear sclera with normal ocular movement  RESPIRATORY: No labored breathing  SKIN: no suspicious lesions or rashes  NEURO: Normal strength and tone, mentation intact and speech normal  VASCULAR: Good pulses, and capillary refill   LYMPH: no lymphadenopathy   PSYCH:  mentation appears normal and affect normal/bright    MUSCULOSKELETAL:  Not in acute distress  Normal gait  Normal neck movement  Normal shoulder movement  Normal elbow movement  Full strength in both upper extremities  Normal pinching, bilateral  Normal gripping, bilateral  Decreased " sensation thumb, index and long, left  Positive Tinel sign, left  Positive Phalen test, left  No thenar eminence atrophy  Intact circulation    Right palm incision is healed well  Full range of motion of the fingers in the right  No focal tenderness at the A1 pulleys, bilateral      ASSESSMENT:  Chronic left carpal tunnel syndrome  History of right carpal tunnel release      PLAN:  We went over the nature of carpal tunnel syndrome.  She is somewhat familiar with pathophysiology from having had the same problem on the right side.  The nature of the surgery and potential complications, recovery time were thoroughly explained.  We also discussed the choices for anesthesia related to surgery.  She for now feels comfortable of doing the surgery under local anesthesia.  At the time of the transverse carpal ligament release, she will have some sensitivity/discomfort.  Potential risk of infection was informed.  Because of ongoing problem at this point she is interested in pursuing left carpal tunnel release under local anesthesia which will be scheduled according to her convenience.            Imaging Interpretation:   None taken today      Sal Soriano MD  Department of Orthopedic Surgery        Disclaimer: This note consists of symbols derived from keyboarding, dictation and/or voice recognition software. As a result, there may be errors in the script that have gone undetected. Please consider this when interpreting information found in this chart.      Again, thank you for allowing me to participate in the care of your patient.        Sincerely,        Sal Soriano MD

## 2020-10-02 ENCOUNTER — TELEPHONE (OUTPATIENT)
Dept: ORTHOPEDICS | Facility: CLINIC | Age: 55
End: 2020-10-02

## 2020-10-02 DIAGNOSIS — Z11.59 ENCOUNTER FOR SCREENING FOR OTHER VIRAL DISEASES: Primary | ICD-10-CM

## 2020-10-02 NOTE — TELEPHONE ENCOUNTER
Attempt to reach patient to schedule surgery.  Left message for patient to call the surgery scheduling line at [phone].     Yamile Segovia Surgery Scheduler

## 2020-10-12 NOTE — TELEPHONE ENCOUNTER
Scheduled surgery.     Whit-please place COVID orders.     Type of surgery: left carpal tunnel release  Location of surgery: Other: [facility]  Date and time of surgery: 10/19/20  Surgeon: Christie   Pre-Op Appt Date: local  Post-Op Appt Date: 10/26/20   Packet sent out: Yes  Pre-cert/Authorization completed:  No  Date: 10/12/20    Yamile Segoiva, Surgery Scheduler

## 2020-10-14 DIAGNOSIS — Z11.59 ENCOUNTER FOR SCREENING FOR OTHER VIRAL DISEASES: ICD-10-CM

## 2020-10-14 PROCEDURE — U0003 INFECTIOUS AGENT DETECTION BY NUCLEIC ACID (DNA OR RNA); SEVERE ACUTE RESPIRATORY SYNDROME CORONAVIRUS 2 (SARS-COV-2) (CORONAVIRUS DISEASE [COVID-19]), AMPLIFIED PROBE TECHNIQUE, MAKING USE OF HIGH THROUGHPUT TECHNOLOGIES AS DESCRIBED BY CMS-2020-01-R: HCPCS | Performed by: ORTHOPAEDIC SURGERY

## 2020-10-15 LAB
SARS-COV-2 RNA SPEC QL NAA+PROBE: NOT DETECTED
SPECIMEN SOURCE: NORMAL

## 2020-10-19 ENCOUNTER — TRANSFERRED RECORDS (OUTPATIENT)
Dept: HEALTH INFORMATION MANAGEMENT | Facility: CLINIC | Age: 55
End: 2020-10-19

## 2020-10-26 ENCOUNTER — OFFICE VISIT (OUTPATIENT)
Dept: ORTHOPEDICS | Facility: CLINIC | Age: 55
End: 2020-10-26
Payer: COMMERCIAL

## 2020-10-26 DIAGNOSIS — Z09 POSTOP CHECK: Primary | ICD-10-CM

## 2020-10-26 PROCEDURE — 99024 POSTOP FOLLOW-UP VISIT: CPT | Performed by: ORTHOPAEDIC SURGERY

## 2020-10-26 NOTE — PATIENT INSTRUCTIONS
Let the Steri-Strips fall off on their own  Gradual increase of the usage of the hand  Follow-up as needed

## 2020-10-26 NOTE — LETTER
10/26/2020         RE: Dalila Roman  [address]  [place] [facility] [id number]        Dear Colleague,    Thank you for referring your patient, Dalila Roman, to the [facility] [place]. Please see a copy of my visit note below.    Subjective:  Dalila Roman is a 55 year old female who is seen in f/u up for left carpal tunnel release, DOS: 10/19/20. Doing well. Incision clean, dry and well healing.  Improvement of pain and paresthesia of the left hand.     Objective: Left carpal tunnel release incision is healing well  Full range of motion of the fingers  Mild ecchymosis in the palm and distal forearm        Imaging studies: None taken today    Assessment: Doing well from left carpal tunnel release  Plan: Gradual increase of usage of the hand  Frequent finger movement  Let the Steri-Strips fall off  Follow-up as needed      Sal Soriano MD  Dept. Orthopedic Surgery  [facility]       Disclaimer: This note consists of symbols derived from keyboarding, dictation and/or voice recognition software. As a result, there may be errors in the script that have gone undetected. Please consider this when interpreting information found in this chart.        Again, thank you for allowing me to participate in the care of your patient.        Sincerely,        Sal Soriano MD

## 2020-10-26 NOTE — PROGRESS NOTES
Subjective:  Dalila Roman is a 55 year old female who is seen in f/u up for left carpal tunnel release, DOS: 10/19/20. Doing well. Incision clean, dry and well healing.  Improvement of pain and paresthesia of the left hand.     Objective: Left carpal tunnel release incision is healing well  Full range of motion of the fingers  Mild ecchymosis in the palm and distal forearm        Imaging studies: None taken today    Assessment: Doing well from left carpal tunnel release  Plan: Gradual increase of usage of the hand  Frequent finger movement  Let the Steri-Strips fall off  Follow-up as needed      Sal Soriano MD  Dept. Orthopedic Surgery  [facility]       Disclaimer: This note consists of symbols derived from keyboarding, dictation and/or voice recognition software. As a result, there may be errors in the script that have gone undetected. Please consider this when interpreting information found in this chart.

## 2020-12-30 ENCOUNTER — TRANSFERRED RECORDS (OUTPATIENT)
Dept: HEALTH INFORMATION MANAGEMENT | Facility: CLINIC | Age: 55
End: 2020-12-30

## 2020-12-30 ENCOUNTER — RECORDS - HEALTHEAST (OUTPATIENT)
Dept: ADMINISTRATIVE | Facility: OTHER | Age: 55
End: 2020-12-30

## 2021-01-06 DIAGNOSIS — Z12.31 ENCOUNTER FOR SCREENING MAMMOGRAM FOR BREAST CANCER: Primary | ICD-10-CM

## 2021-01-06 NOTE — PROGRESS NOTES
Please call and remind her she is almost due for her annual screening mammogram along with 6 month follow-up left breast ultrasound. Due for these in about 1 month. Orders have been placed and someone should be calling to schedule.    Destiny Shah PA-C

## 2021-01-11 ENCOUNTER — TRANSFERRED RECORDS (OUTPATIENT)
Dept: HEALTH INFORMATION MANAGEMENT | Facility: CLINIC | Age: 56
End: 2021-01-11

## 2021-01-14 ENCOUNTER — OFFICE VISIT (OUTPATIENT)
Dept: FAMILY MEDICINE | Facility: CLINIC | Age: 56
End: 2021-01-14
Payer: COMMERCIAL

## 2021-01-14 VITALS
SYSTOLIC BLOOD PRESSURE: 126 MMHG | OXYGEN SATURATION: 98 % | RESPIRATION RATE: 16 BRPM | HEIGHT: 61 IN | DIASTOLIC BLOOD PRESSURE: 86 MMHG | BODY MASS INDEX: 42.1 KG/M2 | TEMPERATURE: 97.6 F | HEART RATE: 68 BPM | WEIGHT: 223 LBS

## 2021-01-14 DIAGNOSIS — Z01.818 PREOP GENERAL PHYSICAL EXAM: Primary | ICD-10-CM

## 2021-01-14 DIAGNOSIS — E66.01 MORBID OBESITY, UNSPECIFIED OBESITY TYPE (H): ICD-10-CM

## 2021-01-14 DIAGNOSIS — M25.511 CHRONIC RIGHT SHOULDER PAIN: ICD-10-CM

## 2021-01-14 DIAGNOSIS — G89.29 CHRONIC RIGHT SHOULDER PAIN: ICD-10-CM

## 2021-01-14 DIAGNOSIS — R03.0 ELEVATED BLOOD PRESSURE READING WITHOUT DIAGNOSIS OF HYPERTENSION: ICD-10-CM

## 2021-01-14 LAB
ERYTHROCYTE [DISTWIDTH] IN BLOOD BY AUTOMATED COUNT: 12.8 % (ref 10–15)
HCT VFR BLD AUTO: 40.6 % (ref 35–47)
HGB BLD-MCNC: 13.2 G/DL (ref 11.7–15.7)
MCH RBC QN AUTO: 30.1 PG (ref 26.5–33)
MCHC RBC AUTO-ENTMCNC: 32.5 G/DL (ref 31.5–36.5)
MCV RBC AUTO: 93 FL (ref 78–100)
PLATELET # BLD AUTO: 173 10E9/L (ref 150–450)
RBC # BLD AUTO: 4.38 10E12/L (ref 3.8–5.2)
WBC # BLD AUTO: 10.2 10E9/L (ref 4–11)

## 2021-01-14 PROCEDURE — 85027 COMPLETE CBC AUTOMATED: CPT | Performed by: FAMILY MEDICINE

## 2021-01-14 PROCEDURE — 99214 OFFICE O/P EST MOD 30 MIN: CPT | Performed by: FAMILY MEDICINE

## 2021-01-14 PROCEDURE — 36415 COLL VENOUS BLD VENIPUNCTURE: CPT | Performed by: FAMILY MEDICINE

## 2021-01-14 SDOH — HEALTH STABILITY: MENTAL HEALTH: HOW OFTEN DO YOU HAVE 6 OR MORE DRINKS ON ONE OCCASION?: NOT ASKED

## 2021-01-14 SDOH — HEALTH STABILITY: MENTAL HEALTH: HOW OFTEN DO YOU HAVE A DRINK CONTAINING ALCOHOL?: NOT ASKED

## 2021-01-14 SDOH — HEALTH STABILITY: MENTAL HEALTH: HOW MANY STANDARD DRINKS CONTAINING ALCOHOL DO YOU HAVE ON A TYPICAL DAY?: NOT ASKED

## 2021-01-14 ASSESSMENT — MIFFLIN-ST. JEOR: SCORE: 1544.93

## 2021-01-14 NOTE — PROGRESS NOTES
[facility]  [address] [address]  Phone: [phone]  Primary Provider: Shabana Solomon ( no longer here)  Pre-op Performing Provider: ROSS MONTANO    PREOPERATIVE EVALUATION:  Today's date: 1/14/2021    Dalila Roman is a 55 year old female who presents for a preoperative evaluation.    Surgical Information:  Surgery/Procedure: right shoulder rotator cuff   Surgery Location: [facility]  Surgeon: Dr. Gray  Surgery Date: 01/27/2021  Time of Surgery: TBD  Where patient plans to recover: At home with family  Fax number for surgical facility: [phone]    Type of Anesthesia Anticipated: General    Subjective     HPI related to upcoming procedure:   Right shoulder pain due to dislocation.  >75% tear in rotator cuff and a couple muscles he will be repairing.      Preop Questions 1/14/2021   1. Have you ever had a heart attack or stroke? No   2. Have you ever had surgery on your heart or blood vessels, such as a stent placement, a coronary artery bypass, or surgery on an artery in your head, neck, heart, or legs? No   3. Do you have chest pain with activity? No   4. Do you have a history of  heart failure? No   5. Do you currently have a cold, bronchitis or symptoms of other infection? No   6. Do you have a cough, shortness of breath, or wheezing? No   7. Do you or anyone in your family have previous history of blood clots? No   8. Do you or does anyone in your family have a serious bleeding problem such as prolonged bleeding following surgeries or cuts? No   9. Have you ever had problems with anemia or been told to take iron pills? No   10. Have you had any abnormal blood loss such as black, tarry or bloody stools, or abnormal vaginal bleeding? No   11. Have you ever had a blood transfusion? No   12. Are you willing to have a blood transfusion if it is medically needed before, during, or after your surgery? Yes   13. Have you or any of your  relatives ever had problems with anesthesia? No   14. Do you have sleep apnea, excessive snoring or daytime drowsiness? No   15. Do you have any artifical heart valves or other implanted medical devices like a pacemaker, defibrillator, or continuous glucose monitor? No   16. Do you have artificial joints? No   17. Are you allergic to latex? No   18. Is there any chance that you may be pregnant? No       Health Care Directive:  Patient does not have a Health Care Directive or Living Will: brief discussion of what this is.     Preoperative Review of :   reviewed - controlled substances prescribed by other outside provider(s). small amounts; suspect around this injury.       Status of Chronic Conditions:  See problem list for active medical problems.  Problems all longstanding and stable, except as noted/documented.  See ROS for pertinent symptoms related to these conditions.      Review of Systems  CONSTITUTIONAL: NEGATIVE for fever, chills, change in weight x some gain back   ENT/MOUTH: NEGATIVE for ear, mouth and throat problems  RESP: NEGATIVE for significant cough or SOB  CV: NEGATIVE for chest pain, palpitations or peripheral edema  No nausea, vomitting or change in bowel habits.  No urinary symptoms.    Patient Active Problem List    Diagnosis Date Noted     Elevated blood pressure reading without diagnosis of hypertension 2019     Priority: Medium     Morbid obesity, unspecified obesity type (H) 2017     Priority: Medium     Environmental allergies 2015     Priority: Medium     CARDIOVASCULAR SCREENING; LDL GOAL LESS THAN 160 2013     Priority: Medium      No past medical history on file.  Past Surgical History:   Procedure Laterality Date     CARPAL TUNNEL RELEASE RT/LT Left 10/19/2020    Left carpal tunnel release, Dr. Sal Soriano, [facility] [place] [place]      SECTION      twice,  and      ENDOMETRIAL SAMPLING (BIOPSY)  2017    negative; done for DUB     ENT  "SURGERY      wisdom tooth extraction     ORTHOPEDIC SURGERY      right foot     SOFT TISSUE SURGERY      carpel tunnel     Current Outpatient Medications   Medication Sig Dispense Refill     cetirizine (ZYRTEC) 10 MG tablet Take 1 tablet (10 mg) by mouth daily 90 tablet 3     fish oil-omega-3 fatty acids 1000 MG capsule Take 2 g by mouth every other day       fluticasone (FLONASE) 50 MCG/ACT nasal spray Spray 1 spray into both nostrils daily       Magnesium Oxide 500 MG TABS Take 1 tablet by mouth every other day       meloxicam (MOBIC) 15 MG tablet Take 1 tablet (15 mg) by mouth daily (Patient not taking: Reported on 2020) 30 tablet 1     Vitamin D, Cholecalciferol, 1000 units TABS Take 1 tablet by mouth daily         Allergies   Allergen Reactions     Codeine Sulfate Other (See Comments)     Hallucinations        Social History     Tobacco Use     Smoking status: Former Smoker     Quit date:      Years since quittin.0     Smokeless tobacco: Never Used     Tobacco comment: social smoking in 20s   Substance Use Topics     Alcohol use: Yes     Alcohol/week: 0.0 standard drinks     Comment: wine, belén sometimes, casual     Family History   Problem Relation Age of Onset     Diabetes Mother      Breast Cancer Mother         early 40's     Hypertension Mother      Heart Disease Father         MI     Cancer - colorectal Brother         1 brother - 38     Diabetes Brother         2 brothers     Heart Disease Brother         1 brother had MI s/p triple by pass     Thyroid Disease Daughter 19        Hypothyroidism     History   Drug Use No         Objective     BP (!) 140/84 (BP Location: Right arm, Cuff Size: Adult Large)   Pulse 68   Temp 97.6  F (36.4  C) (Oral)   Resp 16   Ht 1.543 m (5' 0.75\")   Wt 101.2 kg (223 lb)   SpO2 98%   BMI 42.48 kg/m      /86 upon repeat    Physical Exam  GENERAL APPEARANCE: healthy, alert and no distress  HENT: ear canals and TM's normal and nose and mouth " without ulcers or lesions  RESP: lungs clear to auscultation - no rales, rhonchi or wheezes  CV: regular rates and rhythm  ABDOMEN: soft, nontender, no HSM or masses and bowel sounds normal  MS: no edema.   NEURO: Normal strength and tone, sensory exam grossly normal, mentation intact and speech normal  PSYCH: mentation appears normal and affect normal/bright    Reviewed Ortho note from 12/30    Recent Labs   Lab Test 09/15/20  0843 05/17/19  0825   HGB 13.4 14.2    315    142   POTASSIUM 4.7 4.3   CR 0.76 0.77        Diagnostics:  Recent Results (from the past 24 hour(s))   CBC with platelets    Collection Time: 01/14/21  3:34 PM   Result Value Ref Range    WBC 10.2 4.0 - 11.0 10e9/L    RBC Count 4.38 3.8 - 5.2 10e12/L    Hemoglobin 13.2 11.7 - 15.7 g/dL    Hematocrit 40.6 35.0 - 47.0 %    MCV 93 78 - 100 fl    MCH 30.1 26.5 - 33.0 pg    MCHC 32.5 31.5 - 36.5 g/dL    RDW 12.8 10.0 - 15.0 %    Platelet Count 173 150 - 450 10e9/L      No EKG required, no history of coronary heart disease, significant arrhythmia, peripheral arterial disease or other structural heart disease.    Revised Cardiac Risk Index (RCRI):  The patient has the following serious cardiovascular risks for perioperative complications:   - No serious cardiac risks = 0 points     RCRI Interpretation: 0 points: Class I (very low risk - 0.4% complication rate)           Assessment & Plan   The proposed surgical procedure is considered INTERMEDIATE risk.    Preop general physical exam    - CBC with platelets    Chronic right shoulder pain  Anticipating surgery.   - CBC with platelets    Elevated blood pressure reading without diagnosis of hypertension  Reviewed previous blood pressure as well.   Encourage healthy habits.   Recommended to monitor this over the next several months; to be seen if elevated.     Morbid obesity, unspecified obesity type (H)  Did discuss that this can be associated with elevated bp. Encourage weight management;  brief mention of lifestyle factors.       Risks and Recommendations:  The patient has the following additional risks and recommendations for perioperative complications:   - No identified additional risk factors other than previously addressed         Regarding medications:    Stop fish oil now.  Stop ibuprofen 2 days prior to surgery.  Ok to take Tylenol (acetaminophen).    You do not need to take anything on the am of surgery.      RECOMMENDATION:  APPROVAL GIVEN to proceed with proposed procedure, without further diagnostic evaluation.    Signed Electronically by: Meghan Kendall MD, MD    Copy of this evaluation report is provided to requesting physician.    Preop [facility] Preop Guidelines    Revised Cardiac Risk Index

## 2021-01-14 NOTE — PATIENT INSTRUCTIONS

## 2021-01-15 ENCOUNTER — HEALTH MAINTENANCE LETTER (OUTPATIENT)
Age: 56
End: 2021-01-15

## 2021-01-27 ENCOUNTER — TRANSFERRED RECORDS (OUTPATIENT)
Dept: HEALTH INFORMATION MANAGEMENT | Facility: CLINIC | Age: 56
End: 2021-01-27

## 2021-02-05 ENCOUNTER — TRANSFERRED RECORDS (OUTPATIENT)
Dept: HEALTH INFORMATION MANAGEMENT | Facility: CLINIC | Age: 56
End: 2021-02-05

## 2021-02-12 ENCOUNTER — HOSPITAL ENCOUNTER (OUTPATIENT)
Dept: MAMMOGRAPHY | Facility: CLINIC | Age: 56
End: 2021-02-12
Attending: PHYSICIAN ASSISTANT
Payer: COMMERCIAL

## 2021-02-12 ENCOUNTER — HOSPITAL ENCOUNTER (OUTPATIENT)
Dept: ULTRASOUND IMAGING | Facility: CLINIC | Age: 56
End: 2021-02-12
Attending: PHYSICIAN ASSISTANT
Payer: COMMERCIAL

## 2021-02-12 DIAGNOSIS — R92.8 ABNORMAL MAMMOGRAM: ICD-10-CM

## 2021-02-12 DIAGNOSIS — N60.02 CYST OF LEFT BREAST: ICD-10-CM

## 2021-02-12 PROCEDURE — 76642 ULTRASOUND BREAST LIMITED: CPT | Mod: LT

## 2021-02-12 PROCEDURE — G0279 TOMOSYNTHESIS, MAMMO: HCPCS

## 2021-03-16 ENCOUNTER — TRANSFERRED RECORDS (OUTPATIENT)
Dept: HEALTH INFORMATION MANAGEMENT | Facility: CLINIC | Age: 56
End: 2021-03-16

## 2021-03-25 ENCOUNTER — IMMUNIZATION (OUTPATIENT)
Dept: NURSING | Facility: CLINIC | Age: 56
End: 2021-03-25
Payer: COMMERCIAL

## 2021-03-25 PROCEDURE — 0001A PR COVID VAC PFIZER DIL RECON 30 MCG/0.3 ML IM: CPT

## 2021-03-25 PROCEDURE — 91300 PR COVID VAC PFIZER DIL RECON 30 MCG/0.3 ML IM: CPT

## 2021-04-15 ENCOUNTER — IMMUNIZATION (OUTPATIENT)
Dept: NURSING | Facility: CLINIC | Age: 56
End: 2021-04-15
Attending: INTERNAL MEDICINE
Payer: COMMERCIAL

## 2021-04-15 PROCEDURE — 0002A PR COVID VAC PFIZER DIL RECON 30 MCG/0.3 ML IM: CPT

## 2021-04-15 PROCEDURE — 91300 PR COVID VAC PFIZER DIL RECON 30 MCG/0.3 ML IM: CPT

## 2021-05-03 ENCOUNTER — TRANSFERRED RECORDS (OUTPATIENT)
Dept: HEALTH INFORMATION MANAGEMENT | Facility: CLINIC | Age: 56
End: 2021-05-03

## 2021-06-14 ENCOUNTER — TRANSFERRED RECORDS (OUTPATIENT)
Dept: HEALTH INFORMATION MANAGEMENT | Facility: CLINIC | Age: 56
End: 2021-06-14

## 2021-10-24 ENCOUNTER — HEALTH MAINTENANCE LETTER (OUTPATIENT)
Age: 56
End: 2021-10-24

## 2021-12-19 ENCOUNTER — HEALTH MAINTENANCE LETTER (OUTPATIENT)
Age: 56
End: 2021-12-19

## 2022-02-10 ENCOUNTER — E-VISIT (OUTPATIENT)
Dept: FAMILY MEDICINE | Facility: CLINIC | Age: 57
End: 2022-02-10
Payer: COMMERCIAL

## 2022-02-10 DIAGNOSIS — H92.03 OTALGIA OF BOTH EARS: Primary | ICD-10-CM

## 2022-02-10 PROCEDURE — 99421 OL DIG E/M SVC 5-10 MIN: CPT | Performed by: PHYSICIAN ASSISTANT

## 2022-02-18 ENCOUNTER — HOSPITAL ENCOUNTER (OUTPATIENT)
Dept: MAMMOGRAPHY | Facility: CLINIC | Age: 57
End: 2022-02-18
Attending: PHYSICIAN ASSISTANT
Payer: COMMERCIAL

## 2022-02-18 ENCOUNTER — HOSPITAL ENCOUNTER (OUTPATIENT)
Dept: ULTRASOUND IMAGING | Facility: CLINIC | Age: 57
End: 2022-02-18
Attending: PHYSICIAN ASSISTANT
Payer: COMMERCIAL

## 2022-02-18 DIAGNOSIS — N60.02 BREAST CYST, LEFT: ICD-10-CM

## 2022-02-18 PROCEDURE — 76642 ULTRASOUND BREAST LIMITED: CPT | Mod: LT

## 2022-02-18 PROCEDURE — 77062 BREAST TOMOSYNTHESIS BI: CPT

## 2022-03-14 ENCOUNTER — E-VISIT (OUTPATIENT)
Dept: FAMILY MEDICINE | Facility: CLINIC | Age: 57
End: 2022-03-14
Payer: COMMERCIAL

## 2022-03-14 DIAGNOSIS — J01.90 ACUTE BACTERIAL SINUSITIS: Primary | ICD-10-CM

## 2022-03-14 DIAGNOSIS — B96.89 ACUTE BACTERIAL SINUSITIS: Primary | ICD-10-CM

## 2022-03-14 PROCEDURE — 99421 OL DIG E/M SVC 5-10 MIN: CPT | Performed by: PHYSICIAN ASSISTANT

## 2022-03-14 NOTE — PATIENT INSTRUCTIONS
Sinusitis (Antibiotic Treatment)    The sinuses are air-filled spaces within the bones of the face. They connect to the inside of the nose. Sinusitis is an inflammation of the tissue that lines the sinuses. Sinusitis can occur during a cold. It can also happen due to allergies to pollens and other particles in the air. Sinusitis can cause symptoms of sinus congestion and a feeling of fullness. A sinus infection causes fever, headache, and facial pain. There is often green or yellow fluid draining from the nose or into the back of the throat (post-nasal drip). You have been given antibiotics to treat this condition.   Home care    Take the full course of antibiotics as instructed. Don't stop taking them, even when you feel better.    Drink plenty of water, hot tea, and other liquids as directed by the healthcare provider. This may help thin nasal mucus. It also may help your sinuses drain fluids.    Heat may help soothe painful areas of your face. Use a towel soaked in hot water. Or,  the shower and direct the warm spray onto your face. Using a vaporizer along with a menthol rub at night may also help soothe symptoms.     An expectorant with guaifenesin may help thin nasal mucus and help your sinuses drain fluids. Talk with your provider or pharmacists before taking an over-the-counter (OTC) medicine if you have any questions about it or its side effects..    You can use an OTC decongestant, unless a similar medicine was prescribed to you. Nasal sprays work the fastest. Use one that contains phenylephrine or oxymetazoline. First blow your nose gently. Then use the spray. Don't use these medicines more often than directed on the label. If you do, your symptoms may get worse. You may also take pills that contain pseudoephedrine. Don t use products that combine multiple medicines. This is because side effects may be increased. Read labels. You can also ask the pharmacist for help. (People with high blood  pressure should not use decongestants. They can raise blood pressure.) Talk with your provider or pharmacist if you have any questions about the medicine..    OTC antihistamines may help if allergies contributed to your sinusitis. Talk with your provider or pharmacist if you have any questions about the medicine..    Don't use nasal rinses or irrigation during an acute sinus infection, unless your healthcare provider tells you to. Rinsing may spread the infection to other areas in your sinuses.    Use acetaminophen or ibuprofen to control pain, unless another pain medicine was prescribed to you. If you have chronic liver or kidney disease or ever had a stomach ulcer, talk with your healthcare provider before using these medicines. Never give aspirin to anyone under age 18 who is ill with a fever. It may cause severe liver damage.    Don't smoke. This can make symptoms worse.    Follow-up care  Follow up with your healthcare provider, or as advised.   When to seek medical advice  Call your healthcare provider if any of these occur:     Facial pain or headache that gets worse    Stiff neck    Unusual drowsiness or confusion    Swelling of your forehead or eyelids    Symptoms don't go away in 10 days    Vision problems, such as blurred or double vision    Fever of 100.4 F (38 C) or higher, or as directed by your healthcare provider  Call 911  Call 911 if any of these occur:     Seizure    Trouble breathing    Feeling dizzy or faint    Fingernails, skin or lips look blue, purple , or gray  Prevention  Here are steps you can take to help prevent an infection:     Keep good hand washing habits.    Don t have close contact with people who have sore throats, colds, or other upper respiratory infections.    Don t smoke, and stay away from secondhand smoke.    Stay up to date with of your vaccines.  [email or web address] last reviewed this educational content on 12/1/2019 2000-2021 [employer]. All rights reserved. This  information is not intended as a substitute for professional medical care. Always follow your healthcare professional's instructions.        Dear Dalila Roman    After reviewing your responses, I've been able to diagnose you with?a sinus infection caused by bacteria.?     Based on your responses and diagnosis, I have prescribed an antibiotic (augmentin) to treat your symptoms. I have sent this to your pharmacy.?     It is also important to stay well hydrated, get lots of rest and take over-the-counter decongestants,?tylenol?or ibuprofen if you?are able to?take those medications per your primary care provider to help relieve discomfort.?     It is important that you take?all of?your prescribed medication even if your symptoms are improving after a few doses.? Taking?all of?your medicine helps prevent the symptoms from returning.?     If your symptoms worsen, you develop severe headache, vomiting, high fever (>102), or are not improving in 7 days, please contact your primary care provider for an appointment or visit any of our convenient Walk-in Care or Urgent Care Centers to be seen which can be found on our website?here.?     Thanks again for choosing?us?as your health care partner,?   ?  Destiny Shah PA-C?

## 2022-09-09 ENCOUNTER — DOCUMENTATION ONLY (OUTPATIENT)
Dept: LAB | Facility: CLINIC | Age: 57
End: 2022-09-09

## 2022-09-09 DIAGNOSIS — Z13.1 SCREENING FOR DIABETES MELLITUS: ICD-10-CM

## 2022-09-09 DIAGNOSIS — Z13.6 CARDIOVASCULAR SCREENING; LDL GOAL LESS THAN 160: Primary | ICD-10-CM

## 2022-09-15 ENCOUNTER — LAB (OUTPATIENT)
Dept: LAB | Facility: CLINIC | Age: 57
End: 2022-09-15
Payer: COMMERCIAL

## 2022-09-15 DIAGNOSIS — Z13.6 CARDIOVASCULAR SCREENING; LDL GOAL LESS THAN 160: ICD-10-CM

## 2022-09-15 DIAGNOSIS — Z13.1 SCREENING FOR DIABETES MELLITUS: ICD-10-CM

## 2022-09-15 LAB
ANION GAP SERPL CALCULATED.3IONS-SCNC: 7 MMOL/L (ref 3–14)
BUN SERPL-MCNC: 16 MG/DL (ref 7–30)
CALCIUM SERPL-MCNC: 9.2 MG/DL (ref 8.5–10.1)
CHLORIDE BLD-SCNC: 106 MMOL/L (ref 94–109)
CHOLEST SERPL-MCNC: 197 MG/DL
CO2 SERPL-SCNC: 22 MMOL/L (ref 20–32)
CREAT SERPL-MCNC: 0.72 MG/DL (ref 0.52–1.04)
FASTING STATUS PATIENT QL REPORTED: YES
GFR SERPL CREATININE-BSD FRML MDRD: >90 ML/MIN/1.73M2
GLUCOSE BLD-MCNC: 100 MG/DL (ref 70–99)
HDLC SERPL-MCNC: 78 MG/DL
LDLC SERPL CALC-MCNC: 105 MG/DL
NONHDLC SERPL-MCNC: 119 MG/DL
POTASSIUM BLD-SCNC: 4.7 MMOL/L (ref 3.4–5.3)
SODIUM SERPL-SCNC: 135 MMOL/L (ref 133–144)
TRIGL SERPL-MCNC: 69 MG/DL

## 2022-09-15 PROCEDURE — 80061 LIPID PANEL: CPT

## 2022-09-15 PROCEDURE — 80048 BASIC METABOLIC PNL TOTAL CA: CPT

## 2022-09-15 PROCEDURE — 36415 COLL VENOUS BLD VENIPUNCTURE: CPT

## 2022-09-19 ASSESSMENT — ENCOUNTER SYMPTOMS
SORE THROAT: 0
CONSTIPATION: 0
MYALGIAS: 1
HEMATURIA: 0
NERVOUS/ANXIOUS: 0
HEMATOCHEZIA: 0
FEVER: 0
WEAKNESS: 0
SHORTNESS OF BREATH: 0
HEADACHES: 0
EYE PAIN: 0
PARESTHESIAS: 0
BREAST MASS: 0
ABDOMINAL PAIN: 0
ARTHRALGIAS: 1
DYSURIA: 0
COUGH: 0
FREQUENCY: 0
DIZZINESS: 0
CHILLS: 0
DIARRHEA: 1
PALPITATIONS: 0
NAUSEA: 0
HEARTBURN: 0
JOINT SWELLING: 0

## 2022-09-21 ENCOUNTER — PATIENT OUTREACH (OUTPATIENT)
Dept: ONCOLOGY | Facility: CLINIC | Age: 57
End: 2022-09-21

## 2022-09-21 ENCOUNTER — OFFICE VISIT (OUTPATIENT)
Dept: FAMILY MEDICINE | Facility: CLINIC | Age: 57
End: 2022-09-21
Payer: COMMERCIAL

## 2022-09-21 VITALS
BODY MASS INDEX: 45.82 KG/M2 | DIASTOLIC BLOOD PRESSURE: 74 MMHG | HEIGHT: 61 IN | HEART RATE: 74 BPM | RESPIRATION RATE: 16 BRPM | TEMPERATURE: 97.8 F | OXYGEN SATURATION: 99 % | SYSTOLIC BLOOD PRESSURE: 128 MMHG | WEIGHT: 242.7 LBS

## 2022-09-21 DIAGNOSIS — E66.01 MORBID OBESITY, UNSPECIFIED OBESITY TYPE (H): ICD-10-CM

## 2022-09-21 DIAGNOSIS — Z12.11 SCREEN FOR COLON CANCER: ICD-10-CM

## 2022-09-21 DIAGNOSIS — H57.89 EYE IRRITATION: ICD-10-CM

## 2022-09-21 DIAGNOSIS — Z12.4 CERVICAL CANCER SCREENING: ICD-10-CM

## 2022-09-21 DIAGNOSIS — R21 RASH AND NONSPECIFIC SKIN ERUPTION: ICD-10-CM

## 2022-09-21 DIAGNOSIS — Z00.00 ROUTINE GENERAL MEDICAL EXAMINATION AT A HEALTH CARE FACILITY: Primary | ICD-10-CM

## 2022-09-21 DIAGNOSIS — Z80.3 FAMILY HISTORY OF MALIGNANT NEOPLASM OF BREAST: ICD-10-CM

## 2022-09-21 DIAGNOSIS — M79.671 PAIN IN BOTH FEET: ICD-10-CM

## 2022-09-21 DIAGNOSIS — Z80.0 FAMILY HISTORY OF COLON CANCER: ICD-10-CM

## 2022-09-21 DIAGNOSIS — M79.672 PAIN IN BOTH FEET: ICD-10-CM

## 2022-09-21 PROCEDURE — 99213 OFFICE O/P EST LOW 20 MIN: CPT | Mod: 25 | Performed by: PHYSICIAN ASSISTANT

## 2022-09-21 PROCEDURE — 99396 PREV VISIT EST AGE 40-64: CPT | Mod: 25 | Performed by: PHYSICIAN ASSISTANT

## 2022-09-21 PROCEDURE — 0124A COVID-19,PF,PFIZER BOOSTER BIVALENT: CPT | Performed by: PHYSICIAN ASSISTANT

## 2022-09-21 PROCEDURE — 91312 COVID-19,PF,PFIZER BOOSTER BIVALENT: CPT | Performed by: PHYSICIAN ASSISTANT

## 2022-09-21 RX ORDER — TRIAMCINOLONE ACETONIDE 1 MG/G
OINTMENT TOPICAL 2 TIMES DAILY
Qty: 30 G | Refills: 0 | Status: SHIPPED | OUTPATIENT
Start: 2022-09-21 | End: 2024-06-04

## 2022-09-21 RX ORDER — OLOPATADINE HYDROCHLORIDE 1 MG/ML
1 SOLUTION/ DROPS OPHTHALMIC 2 TIMES DAILY
Qty: 5 ML | Refills: 0 | Status: SHIPPED | OUTPATIENT
Start: 2022-09-21 | End: 2024-06-04

## 2022-09-21 ASSESSMENT — ENCOUNTER SYMPTOMS
MYALGIAS: 1
PALPITATIONS: 0
DIARRHEA: 1
CONSTIPATION: 0
NAUSEA: 0
HEMATOCHEZIA: 0
PARESTHESIAS: 0
HEARTBURN: 0
COUGH: 0
ABDOMINAL PAIN: 0
ARTHRALGIAS: 1
SHORTNESS OF BREATH: 0
HEMATURIA: 0
NERVOUS/ANXIOUS: 0
EYE PAIN: 0
BREAST MASS: 0
CHILLS: 0
FEVER: 0
DYSURIA: 0
JOINT SWELLING: 0
SORE THROAT: 0
HEADACHES: 0
WEAKNESS: 0
FREQUENCY: 0
DIZZINESS: 0

## 2022-09-21 ASSESSMENT — PAIN SCALES - GENERAL: PAINLEVEL: MODERATE PAIN (5)

## 2022-09-21 NOTE — PROGRESS NOTES
Writer received referral, reviewed for appropriate plan, and sent to New Patient Scheduling for completion.    [place] West, BSN, RN, PHN, OCN  Hematology/Oncology Nurse Navigator  [facility]  [phone]

## 2022-09-21 NOTE — PROGRESS NOTES
SUBJECTIVE:   CC: Dalila is an 56 year old who presents for preventive health visit.       Patient has been advised of split billing requirements and indicates understanding: Yes  Healthy Habits:     Getting at least 3 servings of Calcium per day:  Yes    Bi-annual eye exam:  Yes    Dental care twice a year:  NO    Sleep apnea or symptoms of sleep apnea:  None    Diet:  Carbohydrate counting and Gluten-free/reduced    Frequency of exercise:  None    Taking medications regularly:  Not Applicable    PHQ-2 Total Score: 0    Additional concerns today:  Yes    The 10-year ASCVD risk score (Fervalentina GREY JrEb, et al., 2013) is: 1.4%    Values used to calculate the score:      Age: 56 years      Sex: Female      Is Non- : No      Diabetic: No      Tobacco smoker: No      Systolic Blood Pressure: 128 mmHg      Is BP treated: No      HDL Cholesterol: 78 mg/dL      Total Cholesterol: 197 mg/dL       Dry, flaky skin occasionally on left ring finger around where her wedding ring is. Noticing on surrounding fingers as well. Today it looks better than it sometimes does.    Brother is being evaluated for kidney transplant. She would consider being a donor if eligible.    Eyes have been irritated lately. A little red and itchy, watery. Wonders if could be allergies. Uses flonase nasal spray and zyrtec.    Pain on top of feet for past year. Worse with walking and with pressure/tight shoes across the top of the foot. She tried new shoes but no improvement. Would like to see podiatry.    Today's PHQ-2 Score:   PHQ-2 ( 1999 Pfizer) 9/19/2022   Q1: Little interest or pleasure in doing things 0   Q2: Feeling down, depressed or hopeless 0   PHQ-2 Score 0   PHQ-2 Total Score (12-17 Years)- Positive if 3 or more points; Administer PHQ-A if positive -   Q1: Little interest or pleasure in doing things Not at all   Q2: Feeling down, depressed or hopeless Not at all   PHQ-2 Score 0       Abuse: Current or Past (Physical,  Sexual or Emotional) - No  Do you feel safe in your environment? Yes    Have you ever done Advance Care Planning? (For example, a Health Directive, POLST, or a discussion with a medical provider or your loved ones about your wishes): No, advance care planning information given to patient to review.  Patient plans to discuss their wishes with loved ones or provider.      Social History     Tobacco Use     Smoking status: Former Smoker     Quit date:      Years since quittin.7     Smokeless tobacco: Never Used     Tobacco comment: social smoking in 20s   Substance Use Topics     Alcohol use: Yes     Comment: wine, belén sometimes, casual 3-4 per week.     If you drink alcohol do you typically have >3 drinks per day or >7 drinks per week? No    No flowsheet data found.    Reviewed orders with patient.  Reviewed health maintenance and updated orders accordingly - Yes  Lab work is in process  Labs reviewed in EPIC  BP Readings from Last 3 Encounters:   22 128/74   21 126/86   20 126/78    Wt Readings from Last 3 Encounters:   22 110.1 kg (242 lb 11.2 oz)   21 101.2 kg (223 lb)   20 97.1 kg (214 lb)                  Patient Active Problem List   Diagnosis     CARDIOVASCULAR SCREENING; LDL GOAL LESS THAN 160     Environmental allergies     Morbid obesity, unspecified obesity type (H)     Elevated blood pressure reading without diagnosis of hypertension     Past Surgical History:   Procedure Laterality Date     CARPAL TUNNEL RELEASE RT/LT Left 10/19/2020    Left carpal tunnel release, Dr. Sal Soriano, [facility]      SECTION      twice,  and      COLONOSCOPY       ENDOMETRIAL SAMPLING (BIOPSY)  2017    negative; done for DUB     ENT SURGERY      wisdom tooth extraction     ORTHOPEDIC SURGERY      right foot     SOFT TISSUE SURGERY Right     carpel tunnel       Social History     Tobacco Use     Smoking status: Former Smoker     Quit date:       Years since quittin.7     Smokeless tobacco: Never Used     Tobacco comment: social smoking in 20s   Substance Use Topics     Alcohol use: Yes     Comment: wine, belén sometimes, casual 3-4 per week.     Family History   Problem Relation Age of Onset     Diabetes Mother      Breast Cancer Mother         early 40's     Hypertension Mother      Other Cancer Mother         breast     Heart Disease Father         MI     Hypertension Brother      Diabetes Brother      Hypertension Brother      Hyperlipidemia Brother      Kidney Disease Brother      Diabetes Brother      Hypertension Brother      Colon Cancer Brother      Skin Cancer Brother      Thyroid Disease Daughter 19        Hypothyroidism     Thyroid Disease Daughter          Current Outpatient Medications   Medication Sig Dispense Refill     cetirizine (ZYRTEC) 10 MG tablet Take 1 tablet (10 mg) by mouth daily 90 tablet 3     fish oil-omega-3 fatty acids 1000 MG capsule Take 2 g by mouth every other day       fluticasone (FLONASE) 50 MCG/ACT nasal spray Spray 1 spray into both nostrils daily       Magnesium Oxide 500 MG TABS Take 1 tablet by mouth every other day       olopatadine (PATANOL) 0.1 % ophthalmic solution Place 1 drop into both eyes 2 times daily 5 mL 0     triamcinolone (KENALOG) 0.1 % external ointment Apply topically 2 times daily Apply to affected area on hand. Do not use for more than 2 weeks in a row. Do not use on face or skin folds. 30 g 0     Vitamin D, Cholecalciferol, 1000 units TABS Take 1 tablet by mouth daily       Allergies   Allergen Reactions     Codeine Sulfate Other (See Comments)     Hallucinations     Recent Labs   Lab Test 09/15/22  0923 09/15/20  0843 19  0825 17  0530 17  1037   * 101* 138*   < >  --    HDL 78 93 74   < >  --    TRIG 69 54 56   < >  --    ALT  --  32 38  --  29   CR 0.72 0.76 0.77   < > 0.74   GFRESTIMATED >90 89 88   < > 82   GFRESTBLACK  --  >90 >90   < > >90    GFR Calc     POTASSIUM 4.7 4.7 4.3   < > 4.0   TSH  --  1.69 2.53  --   --     < > = values in this interval not displayed.        Breast Cancer Screening:    FHS-7:   Breast CA Risk Assessment (FHS-7) 2022   Did any of your first-degree relatives have breast or ovarian cancer? Yes Yes   Did any of your relatives have bilateral breast cancer? No Unknown   Did any man in your family have breast cancer? No No   Did any woman in your family have breast and ovarian cancer? No Yes   Did any woman in your family have breast cancer before age 50 y? Yes Yes   Do you have 2 or more relatives with breast and/or ovarian cancer? No No   Do you have 2 or more relatives with breast and/or bowel cancer? Yes No     Mom had breast cancer, brother had colon cancer.     Mammogram Screening: Recommended mammography every 1-2 years with patient discussion and risk factor consideration  Pertinent mammograms are reviewed under the imaging tab.    History of abnormal Pap smear: denies history of abnormal pap. Follows with OB/GYN. YANI completed today.     Reviewed and updated as needed this visit by clinical staff   Tobacco  Allergies  Meds  Problems  Med Hx  Surg Hx  Fam Hx  Soc   Hx          Reviewed and updated as needed this visit by Provider   Tobacco  Allergies  Meds  Problems  Med Hx  Surg Hx  Fam Hx           Past Medical History:   Diagnosis Date     Environmental allergies 2015      Past Surgical History:   Procedure Laterality Date     CARPAL TUNNEL RELEASE RT/LT Left 10/19/2020    Left carpal tunnel release, Dr. Sal Soriano, [facility]      SECTION      twice,  and      COLONOSCOPY       ENDOMETRIAL SAMPLING (BIOPSY)  2017    negative; done for DUB     ENT SURGERY      wisdom tooth extraction     ORTHOPEDIC SURGERY      right foot     SOFT TISSUE SURGERY Right     carpel tunnel     OB History   No obstetric history on file.       Review of Systems  "  Constitutional: Negative for chills and fever.   HENT: Negative for congestion, ear pain, hearing loss and sore throat.    Eyes: Negative for pain and visual disturbance.   Respiratory: Negative for cough and shortness of breath.    Cardiovascular: Negative for chest pain, palpitations and peripheral edema.   Gastrointestinal: Positive for diarrhea. Negative for abdominal pain, constipation, heartburn, hematochezia and nausea.   Breasts:  Negative for tenderness, breast mass and discharge.   Genitourinary: Negative for dysuria, frequency, genital sores, hematuria, pelvic pain, urgency, vaginal bleeding and vaginal discharge.   Musculoskeletal: Positive for arthralgias and myalgias. Negative for joint swelling.   Skin: Positive for rash.   Neurological: Negative for dizziness, weakness, headaches and paresthesias.   Psychiatric/Behavioral: Negative for mood changes. The patient is not nervous/anxious.         OBJECTIVE:   /74 (BP Location: Right arm, Patient Position: Sitting, Cuff Size: Adult Large)   Pulse 74   Temp 97.8  F (36.6  C) (Oral)   Resp 16   Ht 1.537 m (5' 0.5\")   Wt 110.1 kg (242 lb 11.2 oz)   SpO2 99%   BMI 46.62 kg/m    Physical Exam  GENERAL: healthy, alert and no distress  EYES: Eyes grossly normal to inspection, PERRL and conjunctivae and sclerae normal  HENT: ear canals and TM's normal, nose and mouth without ulcers or lesions  NECK: no adenopathy, no asymmetry, masses, or scars and thyroid normal to palpation  RESP: lungs clear to auscultation - no rales, rhonchi or wheezes  BREAST: declined - does through OB/GYN  CV: regular rate and rhythm, normal S1 S2, no S3 or S4, no murmur, click or rub, no peripheral edema and peripheral pulses strong  ABDOMEN: soft, nontender, no hepatosplenomegaly, no masses and bowel sounds normal  MS: no gross musculoskeletal defects noted, no edema  SKIN: no suspicious lesions or rashes, mild dry appearing skin left ring finger  NEURO: Normal strength " and tone, mentation intact and speech normal  PSYCH: mentation appears normal, affect normal/bright    Diagnostic Test Results:  Labs reviewed in Epic    ASSESSMENT/PLAN:   Routine general medical examination at a health care facility  Reviewed personal and family history. Reviewed age appropriate screenings. Reviewed healthy BP and BMI ranges. Counseled on lifestyle modifications for optimal mental and physical health.  Discussed age-appropriate health maintenance. Recommended any needed vaccinations. Continue to focus on well balanced diet and exercise.     Screen for colon cancer  Done through [facility] - YANI completed today    Cervical cancer screening  Follows with OB/GYN - YANI completed today    Morbid obesity, unspecified obesity type (H)  Discussed options to consider medication, meet with weight clinic. She will think about it for now and let me know if she wants to talk more about options.    Family history of colon cancer  Family history of malignant neoplasm of breast  Colon cancer in brother and breast cancer in mom. Discussed genetic counseling evaluation and she will check with insurance about coverage.  - Cancer Risk Mgmt/Cancer Genetic Counseling Referral; Future    Rash and nonspecific skin eruption  Suspect eczema. Recommend topical steroid PRN. Keep area well moisturized.  - triamcinolone (KENALOG) 0.1 % external ointment; Apply topically 2 times daily Apply to affected area on hand. Do not use for more than 2 weeks in a row. Do not use on face or skin folds.    Eye irritation  Suspect allergic conjunctivitis. No eye pain, vision changes, pain with eye movements. Normal eye exam today. Try allergy eye drops and follow-up if worsening or no improvement.  - olopatadine (PATANOL) 0.1 % ophthalmic solution; Place 1 drop into both eyes 2 times daily    Pain in both feet  Ongoing for past year or so. Would like to see podiatry.  - Orthopedic  Referral; Future      Patient has been advised of split  "billing requirements and indicates understanding: Yes    COUNSELING:  Reviewed preventive health counseling, as reflected in patient instructions    Estimated body mass index is 46.62 kg/m  as calculated from the following:    Height as of this encounter: 1.537 m (5' 0.5\").    Weight as of this encounter: 110.1 kg (242 lb 11.2 oz).    Weight management plan: Discussed healthy diet and exercise guidelines    She reports that she quit smoking about 32 years ago. She has never used smokeless tobacco.      Counseling Resources:  ATP IV Guidelines  Pooled Cohorts Equation Calculator  Breast Cancer Risk Calculator  BRCA-Related Cancer Risk Assessment: FHS-7 Tool  FRAX Risk Assessment  ICSI Preventive Guidelines  Dietary Guidelines for Americans, 2010  USDA's MyPlate  ASA Prophylaxis  Lung CA Screening    [facility]  "

## 2022-10-11 ENCOUNTER — MYC MEDICAL ADVICE (OUTPATIENT)
Dept: FAMILY MEDICINE | Facility: CLINIC | Age: 57
End: 2022-10-11

## 2022-10-12 ENCOUNTER — ANCILLARY PROCEDURE (OUTPATIENT)
Dept: GENERAL RADIOLOGY | Facility: CLINIC | Age: 57
End: 2022-10-12
Attending: PODIATRIST
Payer: COMMERCIAL

## 2022-10-12 ENCOUNTER — OFFICE VISIT (OUTPATIENT)
Dept: PODIATRY | Facility: CLINIC | Age: 57
End: 2022-10-12
Payer: COMMERCIAL

## 2022-10-12 VITALS
WEIGHT: 239 LBS | BODY MASS INDEX: 45.12 KG/M2 | DIASTOLIC BLOOD PRESSURE: 88 MMHG | SYSTOLIC BLOOD PRESSURE: 148 MMHG | HEIGHT: 61 IN

## 2022-10-12 DIAGNOSIS — M79.671 FOOT PAIN, BILATERAL: Primary | ICD-10-CM

## 2022-10-12 DIAGNOSIS — M79.672 FOOT PAIN, BILATERAL: Primary | ICD-10-CM

## 2022-10-12 DIAGNOSIS — M19.072 ARTHRITIS OF BOTH FEET: ICD-10-CM

## 2022-10-12 DIAGNOSIS — M79.671 FOOT PAIN, BILATERAL: ICD-10-CM

## 2022-10-12 DIAGNOSIS — M79.672 FOOT PAIN, BILATERAL: ICD-10-CM

## 2022-10-12 DIAGNOSIS — M20.12 HAV (HALLUX ABDUCTO VALGUS), LEFT: ICD-10-CM

## 2022-10-12 DIAGNOSIS — M19.071 ARTHRITIS OF BOTH FEET: ICD-10-CM

## 2022-10-12 PROCEDURE — 99204 OFFICE O/P NEW MOD 45 MIN: CPT | Performed by: PODIATRIST

## 2022-10-12 PROCEDURE — 73630 X-RAY EXAM OF FOOT: CPT | Mod: TC | Performed by: RADIOLOGY

## 2022-10-12 NOTE — PATIENT INSTRUCTIONS
Thank you for choosing [facility]!    DR HADDAD'S CLINIC:  [facility]   [id number]   [place] [address]      TRIAGE LINE: [phone]  APPOINTMENTS: [phone]  RADIOLOGY: [phone]  SET UP SURGERY: [phone]  FAX NUMBER: [phone]  BILLING QUESTIONS: [phone]       Follow up: as needed    Recommendations: New balance, Hoka shoes, and SAS shoes  Osteoarthritis of the Foot and Ankle  What Is Osteoarthritis?  Osteoarthritis is a condition characterized by the breakdown and eventual loss of cartilage in one or more joints. Cartilage (the connective tissue found at the end of the bones in the joints) protects and cushions the bones during movement. When cartilage deteriorates or is lost, symptoms develop that can restrict one s ability to easily perform daily activities.  Osteoarthritis is also known as degenerative arthritis, reflecting its nature to develop as part of the aging process. As the most common form of arthritis, osteoarthritis affects millions of Americans. Some people refer to osteoarthritis simply as arthritis, even though there are many different types of arthritis.  Osteoarthritis appears at various joints throughout the body, including the hands, feet, spine, hips, and knees. In the foot, the disease most frequently occurs in the big toe, although it is also often found in the midfoot and ankle.  Causes  Osteoarthritis is considered a  wear and tear  disease because the cartilage in the joint wears down with repeated stress and use over time. As the cartilage deteriorates and gets thinner, the bones lose their protective covering and eventually may rub together, causing pain and inflammation of the joint.  An injury may also lead to osteoarthritis, although it may take months or years after the injury for the condition to develop. For example, osteoarthritis in the big toe is often caused by kicking or jamming the  toe, or by dropping something on the toe. Osteoarthritis in the midfoot is often caused by dropping something on it, or by a sprain or fracture. In the ankle, osteoarthritis is usually caused by a fracture and occasionally by a severe sprain.  Sometimes osteoarthritis develops as a result of abnormal foot mechanics such as flat feet or high arches. A flat foot causes less stability in the ligaments (bands of tissue that connect bones), resulting in excessive strain on the joints, which can cause arthritis. A high arch is rigid and lacks mobility, causing a jamming of joints that creates an increased risk of arthritis.  Symptoms  People with osteoarthritis in the foot or ankle experience, in varying degrees, one or more of the following:  Pain and stiffness in the joint   Swelling in or near the joint   Difficulty walking or bending the joint   Some patients with osteoarthritis also develop a bone spur (a bony protrusion) at the affected joint. Shoe pressure may cause pain at the site of a bone spur, and in some cases blisters or calluses may form over its surface. Bone spurs can also limit the movement of the joint.  Diagnosis  In diagnosing osteoarthritis, the foot and ankle surgeon will examine the foot thoroughly, looking for swelling in the joint, limited mobility, and pain with movement. In some cases, deformity and/or enlargement (spur) of the joint may be noted. X-rays may be ordered to evaluate the extent of the disease.  Non-surgical Treatment  To help relieve symptoms, the surgeon may begin treating osteoarthritis with one or more of the following non-surgical approaches:  Oral medications. Nonsteroidal anti-inflammatory drugs (NSAIDs), such as ibuprofen, are often helpful in reducing the inflammation and pain. Occasionally a prescription for a steroid medication is needed to adequately reduce symptoms.   Orthotic devices. Custom orthotic devices (shoe inserts) are often prescribed to provide support to  improve the foot s mechanics or cushioning to help minimize pain.   Bracing. Bracing, which restricts motion and supports the joint, can reduce pain during walking and help prevent further deformity.   Immobilization. Protecting the foot from movement by wearing a cast or removable cast-boot may be necessary to allow the inflammation to resolve.   Steroid injections. In some cases, steroid injections are applied to the affected joint to deliver anti-inflammatory medication.   Physical therapy. Exercises to strengthen the muscles, especially when the osteoarthritis occurs in the ankle, may give the patient greater stability and help avoid injury that might worsen the condition.   When Is Surgery Needed?  When osteoarthritis has progressed substantially or failed to improve with non-surgical treatment, surgery may be recommended. In advanced cases, surgery may be the only option. The goal of surgery is to decrease pain and improve function. The foot and ankle surgeon will consider a number of factors when selecting the procedure best suited to the patient s condition and lifestyle.    OVER THE COUNTER INSERTS    Most of these can be found at your local [employer], [employer], or online:    SuperFeet   Sofsole Fit [facility]   Power Step   Walk-Fit ([employer])  *For heel pain* Arch Cradles  *For heel pain*       ** A good high quality over the counter insert should cost around $40-$50    ** Capsulitis/Metarasalgia - try adding a metatarsal pad on your inserts or find an insert with one built in

## 2022-10-12 NOTE — PROGRESS NOTES
PATIENT HISTORY:  Destiny Shah PA-C requested I see this patient for their foot issue.  Dalila Roman is a 57 year old female who presents to clinic for foot pain bilateral. Notes pain to the tops of both feet. Has been that way for years. Shooting pain. Worse with walking. Will ache.  Can be 8/10 at its worst.  No injury. Takes ibuprofen at times for pain. As had previous surgery on left foot that required hip bone  Wondering what is causing the pain and what can be done for it.    Review of Systems:  Patient denies fever, chills, rash, wound, numbness, weakness, heart burn, blood in stool, chest pain with activity, calf pain when walking, shortness of breath with activity, chronic cough, easy bleeding/bruising, swelling of ankles, excessive thirst, fatigue, depression, anxiety.  Patient admits to stiffness, limping at times.     PAST MEDICAL HISTORY:   Past Medical History:   Diagnosis Date     Environmental allergies 2015        PAST SURGICAL HISTORY:   Past Surgical History:   Procedure Laterality Date     CARPAL TUNNEL RELEASE RT/LT Left 10/19/2020    Left carpal tunnel release, Dr. Sal Soriano, [facility] [place]      SECTION      twice,  and      COLONOSCOPY       ENDOMETRIAL SAMPLING (BIOPSY)  2017    negative; done for DUB     ENT SURGERY      wisdom tooth extraction     ORTHOPEDIC SURGERY      right foot     SOFT TISSUE SURGERY Right     carpel tunnel        MEDICATIONS:   Current Outpatient Medications:      cetirizine (ZYRTEC) 10 MG tablet, Take 1 tablet (10 mg) by mouth daily, Disp: 90 tablet, Rfl: 3     fish oil-omega-3 fatty acids 1000 MG capsule, Take 2 g by mouth every other day, Disp: , Rfl:      fluticasone (FLONASE) 50 MCG/ACT nasal spray, Spray 1 spray into both nostrils daily, Disp: , Rfl:      Magnesium Oxide 500 MG TABS, Take 1 tablet by mouth every other day, Disp: , Rfl:      olopatadine (PATANOL) 0.1 % ophthalmic solution, Place 1 drop into both eyes 2  times daily, Disp: 5 mL, Rfl: 0     triamcinolone (KENALOG) 0.1 % external ointment, Apply topically 2 times daily Apply to affected area on hand. Do not use for more than 2 weeks in a row. Do not use on face or skin folds., Disp: 30 g, Rfl: 0     Vitamin D, Cholecalciferol, 1000 units TABS, Take 1 tablet by mouth daily, Disp: , Rfl:      ALLERGIES:    Allergies   Allergen Reactions     Codeine Sulfate Other (See Comments)     Hallucinations        SOCIAL HISTORY:   Social History     Socioeconomic History     Marital status:      Spouse name: Not on file     Number of children: Not on file     Years of education: Not on file     Highest education level: Not on file   Occupational History     Not on file   Tobacco Use     Smoking status: Former     Types: Cigarettes     Quit date:      Years since quittin.8     Smokeless tobacco: Never     Tobacco comments:     social smoking in 20s   Vaping Use     Vaping Use: Never used   Substance and Sexual Activity     Alcohol use: Yes     Comment: wine, belén sometimes, casual 3-4 per week.     Drug use: No     Sexual activity: Yes     Partners: Male     Birth control/protection: None   Other Topics Concern     Parent/sibling w/ CABG, MI or angioplasty before 65F 55M? Yes     Comment: brother   Social History Narrative     Not on file     Social Determinants of Health     Financial Resource Strain: Not on file   Food Insecurity: Not on file   Transportation Needs: Not on file   Physical Activity: Not on file   Stress: Not on file   Social Connections: Not on file   Intimate Partner Violence: Not on file   Housing Stability: Not on file        FAMILY HISTORY:   Family History   Problem Relation Age of Onset     Diabetes Mother      Breast Cancer Mother         early 40's     Hypertension Mother      Other Cancer Mother         breast     Heart Disease Father         MI     Hypertension Brother      Diabetes Brother      Hypertension Brother      Hyperlipidemia  "Brother      Kidney Disease Brother      Diabetes Brother      Hypertension Brother      Colon Cancer Brother      Skin Cancer Brother      Thyroid Disease Daughter 19        Hypothyroidism     Thyroid Disease Daughter         EXAM:Vitals: BP (!) 148/88   Ht 1.537 m (5' 0.51\")   Wt 108.4 kg (239 lb)   BMI 45.89 kg/m    BMI= Body mass index is 45.89 kg/m .    General appearance: Patient is alert and fully cooperative with history & exam.  No sign of distress is noted during the visit.     Psychiatric: Affect is pleasant & appropriate.  Patient appears motivated to improve health.     Respiratory: Breathing is regular & unlabored while sitting.     HEENT: Hearing is intact to spoken word.  Speech is clear.  No gross evidence of visual impairment that would impact ambulation.     Dermatologic: Skin is intact to both lower extremities without significant lesions, rash or abrasion.  No paronychia or evidence of soft tissue infection is noted.     Vascular: DP & PT pulses are intact & regular bilaterally.  No significant edema or varicosities noted.  CFT and skin temperature is normal to both lower extremities.     Neurologic: Lower extremity sensation is intact to light touch.  No evidence of weakness or contracture in the lower extremities.  No evidence of neuropathy.     Musculoskeletal: Patient is ambulatory without assistive device or brace.  Minimal increase in arch height.  Pain with range of motion of the midfoot and range of motion of the second met cuneiform bilaterally.    Radiographs: bilateral foot xrays -  I personally reviewed the xrays -degenerative changes noted to the midfoot both feet.  No fractures are noted.  Increase in first and second intermetatarsal angle on the left.     ASSESSMENT:    Foot pain, bilateral  Arthritis of both feet  Hav (hallux abducto valgus), left       Medical Decision Making/Plan:  Reviewed patient's chart in [facility].  Reviewed and discussed x-rays. Talked about arthritis and " treatments including orthotics, injections, icing, NSAIDS, bracing, physical therapy, compounding pain cream, or surgical intervention.    At this time she is not interested in any cortisone injections.  She will try topical pain cream, heat, oral NSAIDs and inserts in the shoe.  Recommend continuing to do activity and range of motion of.  If pain continues could try physical therapy.  All questions were answered to patient's satisfaction and she will call for the questions or concerns.    Patient risk factor: Patient is at low risk for infection.        Esperanza Cox DPM, Podiatry/Foot and Ankle Surgery

## 2022-10-12 NOTE — LETTER
10/12/2022         RE: Dalila Roman  [address]  [place] [address]        Dear Colleague,    Thank you for referring your patient, Dalila Roman, to the [facility]. Please see a copy of my visit note below.    PATIENT HISTORY:  Destiny Shah PA-C requested I see this patient for their foot issue.  Dalila Roman is a 57 year old female who presents to clinic for foot pain bilateral. Notes pain to the tops of both feet. Has been that way for years. Shooting pain. Worse with walking. Will ache.  Can be 8/10 at its worst.  No injury. Takes ibuprofen at times for pain. As had previous surgery on left foot that required hip bone  Wondering what is causing the pain and what can be done for it.    Review of Systems:  Patient denies fever, chills, rash, wound, numbness, weakness, heart burn, blood in stool, chest pain with activity, calf pain when walking, shortness of breath with activity, chronic cough, easy bleeding/bruising, swelling of ankles, excessive thirst, fatigue, depression, anxiety.  Patient admits to stiffness, limping at times.     PAST MEDICAL HISTORY:   Past Medical History:   Diagnosis Date     Environmental allergies 2015        PAST SURGICAL HISTORY:   Past Surgical History:   Procedure Laterality Date     CARPAL TUNNEL RELEASE RT/LT Left 10/19/2020    Left carpal tunnel release, Dr. Sal Soriano, [facility]      SECTION      twice,  and      COLONOSCOPY       ENDOMETRIAL SAMPLING (BIOPSY)  2017    negative; done for DUB     ENT SURGERY      wisdom tooth extraction     ORTHOPEDIC SURGERY      right foot     SOFT TISSUE SURGERY Right     carpel tunnel        MEDICATIONS:   Current Outpatient Medications:      cetirizine (ZYRTEC) 10 MG tablet, Take 1 tablet (10 mg) by mouth daily, Disp: 90 tablet, Rfl: 3     fish oil-omega-3 fatty acids 1000 MG capsule, Take 2 g by mouth every other day, Disp: , Rfl:      fluticasone  (FLONASE) 50 MCG/ACT nasal spray, Spray 1 spray into both nostrils daily, Disp: , Rfl:      Magnesium Oxide 500 MG TABS, Take 1 tablet by mouth every other day, Disp: , Rfl:      olopatadine (PATANOL) 0.1 % ophthalmic solution, Place 1 drop into both eyes 2 times daily, Disp: 5 mL, Rfl: 0     triamcinolone (KENALOG) 0.1 % external ointment, Apply topically 2 times daily Apply to affected area on hand. Do not use for more than 2 weeks in a row. Do not use on face or skin folds., Disp: 30 g, Rfl: 0     Vitamin D, Cholecalciferol, 1000 units TABS, Take 1 tablet by mouth daily, Disp: , Rfl:      ALLERGIES:    Allergies   Allergen Reactions     Codeine Sulfate Other (See Comments)     Hallucinations        SOCIAL HISTORY:   Social History     Socioeconomic History     Marital status:      Spouse name: Not on file     Number of children: Not on file     Years of education: Not on file     Highest education level: Not on file   Occupational History     Not on file   Tobacco Use     Smoking status: Former     Types: Cigarettes     Quit date:      Years since quittin.8     Smokeless tobacco: Never     Tobacco comments:     social smoking in 20s   Vaping Use     Vaping Use: Never used   Substance and Sexual Activity     Alcohol use: Yes     Comment: wine, belén sometimes, casual 3-4 per week.     Drug use: No     Sexual activity: Yes     Partners: Male     Birth control/protection: None   Other Topics Concern     Parent/sibling w/ CABG, MI or angioplasty before 65F 55M? Yes     Comment: brother   Social History Narrative     Not on file     Social Determinants of Health     Financial Resource Strain: Not on file   Food Insecurity: Not on file   Transportation Needs: Not on file   Physical Activity: Not on file   Stress: Not on file   Social Connections: Not on file   Intimate Partner Violence: Not on file   Housing Stability: Not on file        FAMILY HISTORY:   Family History   Problem Relation Age of Onset  "    Diabetes Mother      Breast Cancer Mother         early 40's     Hypertension Mother      Other Cancer Mother         breast     Heart Disease Father         MI     Hypertension Brother      Diabetes Brother      Hypertension Brother      Hyperlipidemia Brother      Kidney Disease Brother      Diabetes Brother      Hypertension Brother      Colon Cancer Brother      Skin Cancer Brother      Thyroid Disease Daughter 19        Hypothyroidism     Thyroid Disease Daughter         EXAM:Vitals: BP (!) 148/88   Ht 1.537 m (5' 0.51\")   Wt 108.4 kg (239 lb)   BMI 45.89 kg/m    BMI= Body mass index is 45.89 kg/m .    General appearance: Patient is alert and fully cooperative with history & exam.  No sign of distress is noted during the visit.     Psychiatric: Affect is pleasant & appropriate.  Patient appears motivated to improve health.     Respiratory: Breathing is regular & unlabored while sitting.     HEENT: Hearing is intact to spoken word.  Speech is clear.  No gross evidence of visual impairment that would impact ambulation.     Dermatologic: Skin is intact to both lower extremities without significant lesions, rash or abrasion.  No paronychia or evidence of soft tissue infection is noted.     Vascular: DP & PT pulses are intact & regular bilaterally.  No significant edema or varicosities noted.  CFT and skin temperature is normal to both lower extremities.     Neurologic: Lower extremity sensation is intact to light touch.  No evidence of weakness or contracture in the lower extremities.  No evidence of neuropathy.     Musculoskeletal: Patient is ambulatory without assistive device or brace.  Minimal increase in arch height.  Pain with range of motion of the midfoot and range of motion of the second met cuneiform bilaterally.    Radiographs: bilateral foot xrays -  I personally reviewed the xrays -degenerative changes noted to the midfoot both feet.  No fractures are noted.  Increase in first and second " intermetatarsal angle on the left.     ASSESSMENT:    Foot pain, bilateral  Arthritis of both feet  Hav (hallux abducto valgus), left       Medical Decision Making/Plan:  Reviewed patient's chart in [facility].  Reviewed and discussed x-rays. Talked about arthritis and treatments including orthotics, injections, icing, NSAIDS, bracing, physical therapy, compounding pain cream, or surgical intervention.    At this time she is not interested in any cortisone injections.  She will try topical pain cream, heat, oral NSAIDs and inserts in the shoe.  Recommend continuing to do activity and range of motion of.  If pain continues could try physical therapy.  All questions were answered to patient's satisfaction and she will call for the questions or concerns.    Patient risk factor: Patient is at low risk for infection.        Esperanza Cox DPM, Podiatry/Foot and Ankle Surgery        Again, thank you for allowing me to participate in the care of your patient.        Sincerely,        Esperanza Cox DPM, Podiatry/Foot and Ankle Surgery

## 2022-10-15 ENCOUNTER — HEALTH MAINTENANCE LETTER (OUTPATIENT)
Age: 57
End: 2022-10-15

## 2022-11-01 ENCOUNTER — MYC MEDICAL ADVICE (OUTPATIENT)
Dept: FAMILY MEDICINE | Facility: CLINIC | Age: 57
End: 2022-11-01

## 2022-11-01 NOTE — TELEPHONE ENCOUNTER
Please see [employer] message in reference to referral request. Routed to PCP, Please review and advise.     Thank you,    Greg De La Vega RN

## 2022-11-02 NOTE — TELEPHONE ENCOUNTER
Called patient, [facility]     Greg De La Vega RN on 11/2/2022 at 10:16 AM

## 2022-11-11 ENCOUNTER — VIRTUAL VISIT (OUTPATIENT)
Dept: FAMILY MEDICINE | Facility: CLINIC | Age: 57
End: 2022-11-11
Payer: COMMERCIAL

## 2022-11-11 DIAGNOSIS — E66.01 MORBID OBESITY, UNSPECIFIED OBESITY TYPE (H): Primary | ICD-10-CM

## 2022-11-11 DIAGNOSIS — R73.09 ELEVATED GLUCOSE: ICD-10-CM

## 2022-11-11 PROCEDURE — 99214 OFFICE O/P EST MOD 30 MIN: CPT | Mod: TEL | Performed by: PHYSICIAN ASSISTANT

## 2022-11-11 NOTE — PROGRESS NOTES
Dalila is a 57 year old who is being evaluated via a billable telephone visit.      What phone number would you like to be contacted at? [phone]  How would you like to obtain your AVS? [employer]    Assessment & Plan     Morbid obesity, unspecified obesity type (H)  Discussed options for weight loss, recommend liraglutide. She will do some reading into it and think about it. We did discuss dosing, risks and benefits. If she decides she would like to start, she can send me a [employer] message or call and I can send it in for her.    Elevated glucose  Recent fasting glucose mildly elevated at 100. Plan to check A1c.  - Hemoglobin A1c; Future    35 minutes spent on the date of the encounter doing chart review, history and exam, documentation and further activities per the note     Return in about 10 months (around 9/11/2023) for Preventive Physical Exam.    DIONI Holley [facility]    Subjective   Dalila is a 57 year old, presenting for the following health issues:  Weight Loss (Options/)      History of Present Illness       Reason for visit:  Discuss weight loss    She eats 2-3 servings of fruits and vegetables daily.She consumes 0 sweetened beverage(s) daily.She exercises with enough effort to increase her heart rate 10 to 19 minutes per day.  She exercises with enough effort to increase her heart rate 3 or less days per week.   She is taking medications regularly.     She is currently undergoing evaluation to be a potential kidney donor for her brother. She is a blood match but needs to lose at least 30 pounds before they will proceed to the next steps in evaluation.    She has a long history of weight issues. Has tried keto, weight watchers, exercise. She has lost weight in the past but gained it back.    She has been motivated to lose weight over the past month and has lost 5 pounds in the past 1-2 months.    She has been walking at least 3 miles daily    Has been really good with  "diet lately:  Breakfast: hard boiled egg, cucumbers, fruit  AM snack: cucumbers, green beans  Lunch: salad, chicken  PM snack: cucumbers, green beans  Dinner: beef or pork, vegetables, sometimes carb  Evening snack: rarely  Tries to not eat after 8PM    Wt Readings from Last 3 Encounters:   10/12/22 108.4 kg (239 lb)   09/21/22 110.1 kg (242 lb 11.2 oz)   01/14/21 101.2 kg (223 lb)       Estimated body mass index is 45.89 kg/m  as calculated from the following:    Height as of 10/12/22: 1.537 m (5' 0.51\").    Weight as of 10/12/22: 108.4 kg (239 lb).      Review of Systems   Constitutional, HEENT, cardiovascular, pulmonary, gi and gu systems are negative, except as otherwise noted.      Objective           Vitals:  No vitals were obtained today due to virtual visit.    Physical Exam   healthy, alert and no distress  PSYCH: Alert and oriented times 3; coherent speech, normal   rate and volume, able to articulate logical thoughts, able   to abstract reason, no tangential thoughts, no hallucinations   or delusions  Her affect is normal  RESP: No cough, no audible wheezing, able to talk in full sentences  Remainder of exam unable to be completed due to telephone visits      Phone call duration: 20 minutes    "

## 2022-11-11 NOTE — PATIENT INSTRUCTIONS
We discussed starting a GLP-1 (Glucagon-like Peptide-1) medication called Saxenda. One of the ways it works is by slowing down the rate that food leaves your stomach. You feel camejo and will eat less. It also helps regulate hormones that can help improve your blood sugars.      Dosing for this medication:   Week 1- Inject 0.6 mg daily  Week 2- Inject 1.2 mg daily  Week 3- Inject 1.8 mg daily  Week 4- Inject 2.4 mg daily  Week 5 and thereafter- Inject 3.0 mg daily     Side effects of GLP- Medications include: The most common side effects are all GI related and consist of: nausea, constipation, diarrhea, burping, or gassiness. Patients are advised to eat slowly and less, and nausea typically passes if people can stick it out.      The risk of pancreatitis (inflammation of the pancreas) has been associated with this type of medication, but is very rare. If you have had pancreatitis in the past, this medication may not be for you. Please let us know about any past history of pancreas problems.    Symptoms of pancreatitis include: Pain in your upper stomach area which may travel to your back and be worse after eating. Your stomach area may be tender to the touch.  You may have vomiting or nausea and/or have a fever. If you should develop any of these symptoms, stop the medication and let us know.        There is a small chance you may have some low blood sugar after taking the medication.   The signs of low blood sugar are:  Weakness  Shaky   Hungry  Sweating  Confusion      See below for ways to treat low blood sugar without adding in lots of extra calories.        Treating Low Blood Sugar     If you have symptoms of low blood sugar (sweating, shaking, dizzy, confused) eat 15 grams of carbs and wait 15 minutes:     Glucose Tabs are best for sugars under 70 -  Dex4 or BD Glucose tablets are good, you will need to take 3-4 of these to equal 15 grams.      One small box of raisins  4 oz fruit juice box or   cup fruit  juice  1 small apple  1 small banana    cup canned fruit in water    English muffin or a slice of bread with jelly   1 low fat frozen waffle with sugar-free syrup    cup cottage cheese with   cup frozen or fresh blueberries  1 cup skim or low-fat milk    cup whole grain cereal  4-6 crackers such as Triscuits        This medication is usually not covered by insurance and can be quite expensive. Sometimes a prior authorization is required, which may take up to 1-2 weeks for an insurance company to make a decision if they will cover the medication. Please be patient, you will be notified after a decision has been made.     Here is the web address to sign up for the [employer] savings card.  [email or web address]

## 2022-11-16 ENCOUNTER — LAB (OUTPATIENT)
Dept: LAB | Facility: CLINIC | Age: 57
End: 2022-11-16
Payer: COMMERCIAL

## 2022-11-16 DIAGNOSIS — R73.09 ELEVATED GLUCOSE: ICD-10-CM

## 2022-11-16 LAB — HBA1C MFR BLD: 5.5 % (ref 0–5.6)

## 2022-11-16 PROCEDURE — 83036 HEMOGLOBIN GLYCOSYLATED A1C: CPT

## 2022-11-16 PROCEDURE — 36415 COLL VENOUS BLD VENIPUNCTURE: CPT

## 2023-02-07 ENCOUNTER — HOSPITAL ENCOUNTER (OUTPATIENT)
Dept: MAMMOGRAPHY | Facility: CLINIC | Age: 58
Discharge: HOME | End: 2023-02-07
Attending: PHYSICIAN ASSISTANT | Admitting: PHYSICIAN ASSISTANT
Payer: COMMERCIAL

## 2023-02-07 DIAGNOSIS — Z12.31 VISIT FOR SCREENING MAMMOGRAM: ICD-10-CM

## 2023-02-07 PROCEDURE — 77067 SCR MAMMO BI INCL CAD: CPT

## 2023-06-08 ENCOUNTER — MYC MEDICAL ADVICE (OUTPATIENT)
Dept: FAMILY MEDICINE | Facility: CLINIC | Age: 58
End: 2023-06-08
Payer: COMMERCIAL

## 2023-06-08 DIAGNOSIS — E66.01 MORBID OBESITY, UNSPECIFIED OBESITY TYPE (H): ICD-10-CM

## 2023-07-17 ENCOUNTER — TRANSFERRED RECORDS (OUTPATIENT)
Dept: HEALTH INFORMATION MANAGEMENT | Facility: CLINIC | Age: 58
End: 2023-07-17
Payer: COMMERCIAL

## 2023-08-22 ENCOUNTER — PATIENT OUTREACH (OUTPATIENT)
Dept: CARE COORDINATION | Facility: CLINIC | Age: 58
End: 2023-08-22
Payer: COMMERCIAL

## 2023-09-05 ENCOUNTER — PATIENT OUTREACH (OUTPATIENT)
Dept: CARE COORDINATION | Facility: CLINIC | Age: 58
End: 2023-09-05
Payer: COMMERCIAL

## 2023-09-21 ENCOUNTER — NURSE TRIAGE (OUTPATIENT)
Dept: FAMILY MEDICINE | Facility: CLINIC | Age: 58
End: 2023-09-21
Payer: COMMERCIAL

## 2023-09-21 NOTE — TELEPHONE ENCOUNTER
"Reason for Disposition    Patient wants to be seen    Additional Information    Negative: SEVERE difficulty breathing (e.g., struggling for each breath, speaks in single words)    Negative: Sounds like a life-threatening emergency to the triager    Negative: Throat culture results, call about    Negative: Productive cough is main symptom    Negative: Runny nose is main symptom    Negative: Drooling or spitting out saliva (because can't swallow)    Negative: Unable to open mouth completely    Negative: Drinking very little and has signs of dehydration (e.g., no urine > 12 hours, very dry mouth, very lightheaded)    Negative: Patient sounds very sick or weak to the triager    Negative: Difficulty breathing (per caller) but not severe    Negative: Fever > 103 F (39.4 C)    Negative: Refuses to drink anything for > 12 hours    Negative: SEVERE sore throat pain    Negative: Pus on tonsils (back of throat) and swollen neck lymph nodes ('glands')    Negative: Earache also present    Negative: Widespread rash (especially chest and abdomen)    Negative: Diabetes mellitus or weak immune system (e.g., HIV positive, cancer chemo, splenectomy, organ transplant, chronic steroids)    Negative: History of rheumatic fever    Answer Assessment - Initial Assessment Questions  1. ONSET: \"When did the throat start hurting?\" (Hours or days ago)       3 days ago  2. SEVERITY: \"How bad is the sore throat?\" (Scale 1-10; mild, moderate or severe)    - MILD (1-3):  Doesn't interfere with eating or normal activities.    - MODERATE (4-7): Interferes with eating some solids and normal activities.    - SEVERE (8-10):  Excruciating pain, interferes with most normal activities.    - SEVERE WITH DYSPHAGIA (10): Can't swallow liquids, drooling.      moderate  3. STREP EXPOSURE: \"Has there been any exposure to strep within the past week?\" If Yes, ask: \"What type of contact occurred?\"       Yes, grandkids  4.  VIRAL SYMPTOMS: \"Are there any symptoms of " "a cold, such as a runny nose, cough, hoarse voice or red eyes?\"       Ear pain and swollen glands  5. FEVER: \"Do you have a fever?\" If Yes, ask: \"What is your temperature, how was it measured, and when did it start?\"      Has not take  6. PUS ON THE TONSILS: \"Is there pus on the tonsils in the back of your throat?\"      Nothing noted by   7. OTHER SYMPTOMS: \"Do you have any other symptoms?\" (e.g., difficulty breathing, headache, rash)      Headache  8. PREGNANCY: \"Is there any chance you are pregnant?\" \"When was your last menstrual period?\"      N/a    Protocols used: Sore Throat-A-OH    "

## 2023-10-29 ENCOUNTER — HEALTH MAINTENANCE LETTER (OUTPATIENT)
Age: 58
End: 2023-10-29

## 2024-01-08 ENCOUNTER — PATIENT OUTREACH (OUTPATIENT)
Dept: CARE COORDINATION | Facility: CLINIC | Age: 59
End: 2024-01-08
Payer: COMMERCIAL

## 2024-03-18 ENCOUNTER — NURSE TRIAGE (OUTPATIENT)
Dept: NURSING | Facility: CLINIC | Age: 59
End: 2024-03-18
Payer: COMMERCIAL

## 2024-03-18 ENCOUNTER — OFFICE VISIT (OUTPATIENT)
Dept: URGENT CARE | Facility: URGENT CARE | Age: 59
End: 2024-03-18
Payer: COMMERCIAL

## 2024-03-18 ENCOUNTER — ANCILLARY PROCEDURE (OUTPATIENT)
Dept: GENERAL RADIOLOGY | Facility: CLINIC | Age: 59
End: 2024-03-18
Attending: PHYSICIAN ASSISTANT
Payer: COMMERCIAL

## 2024-03-18 VITALS
RESPIRATION RATE: 14 BRPM | OXYGEN SATURATION: 98 % | BODY MASS INDEX: 46.85 KG/M2 | DIASTOLIC BLOOD PRESSURE: 84 MMHG | TEMPERATURE: 97.9 F | SYSTOLIC BLOOD PRESSURE: 126 MMHG | WEIGHT: 244 LBS | HEART RATE: 71 BPM

## 2024-03-18 DIAGNOSIS — J20.9 ACUTE BRONCHITIS, UNSPECIFIED ORGANISM: ICD-10-CM

## 2024-03-18 DIAGNOSIS — J20.9 ACUTE BRONCHITIS, UNSPECIFIED ORGANISM: Primary | ICD-10-CM

## 2024-03-18 PROCEDURE — 71046 X-RAY EXAM CHEST 2 VIEWS: CPT | Mod: TC | Performed by: RADIOLOGY

## 2024-03-18 PROCEDURE — 99214 OFFICE O/P EST MOD 30 MIN: CPT | Performed by: PHYSICIAN ASSISTANT

## 2024-03-18 RX ORDER — PREDNISONE 20 MG/1
40 TABLET ORAL DAILY
Qty: 10 TABLET | Refills: 0 | Status: SHIPPED | OUTPATIENT
Start: 2024-03-18 | End: 2024-03-23

## 2024-03-18 RX ORDER — ALBUTEROL SULFATE 90 UG/1
1-2 AEROSOL, METERED RESPIRATORY (INHALATION) EVERY 6 HOURS
Qty: 8.5 G | Refills: 0 | Status: SHIPPED | OUTPATIENT
Start: 2024-03-18 | End: 2024-06-04

## 2024-03-18 RX ORDER — BENZONATATE 100 MG/1
100 CAPSULE ORAL 3 TIMES DAILY PRN
Qty: 30 CAPSULE | Refills: 0 | Status: SHIPPED | OUTPATIENT
Start: 2024-03-18 | End: 2024-03-25

## 2024-03-18 NOTE — PATIENT INSTRUCTIONS
Patient was educated on the natural course of condition. Chest x-ray is negative for pneumonia. Take medications as directed. Side effects discussed. Conservative measures discussed including rest, increased fluids, humidifier, and over-the-counter cough suppressants. See your primary care provider if symptoms do not improve in 7 days. Seek emergency care if you develop shortness of breath or fever over 103.

## 2024-03-18 NOTE — PROGRESS NOTES
URGENT CARE VISIT:    SUBJECTIVE:   Dalila Roman is a 58 year old female presenting with a chief complaint of stuffy nose, cough - non-productive, and wheezing.  Onset was 3 week(s) ago.   She denies the following symptoms: stuffy nose and sore throat  Course of illness is same.    Treatment measures tried include Tylenol/Ibuprofen with no relief of symptoms.  Predisposing factors include None.    PMH:   Past Medical History:   Diagnosis Date    Environmental allergies 2015     Allergies: Codeine sulfate   Medications:   Current Outpatient Medications   Medication Sig Dispense Refill    albuterol (PROAIR HFA/PROVENTIL HFA/VENTOLIN HFA) 108 (90 Base) MCG/ACT inhaler Inhale 1-2 puffs into the lungs every 6 hours for 5 days 8.5 g 0    benzonatate (TESSALON) 100 MG capsule Take 1 capsule (100 mg) by mouth 3 times daily as needed for cough 30 capsule 0    cetirizine (ZYRTEC) 10 MG tablet Take 1 tablet (10 mg) by mouth daily 90 tablet 3    fish oil-omega-3 fatty acids 1000 MG capsule Take 2 g by mouth every other day      fluticasone (FLONASE) 50 MCG/ACT nasal spray Spray 1 spray into both nostrils daily      Magnesium Oxide 500 MG TABS Take 1 tablet by mouth every other day      predniSONE (DELTASONE) 20 MG tablet Take 2 tablets (40 mg) by mouth daily for 5 days 10 tablet 0    Vitamin D, Cholecalciferol, 1000 units TABS Take 1 tablet by mouth daily      olopatadine (PATANOL) 0.1 % ophthalmic solution Place 1 drop into both eyes 2 times daily 5 mL 0    triamcinolone (KENALOG) 0.1 % external ointment Apply topically 2 times daily Apply to affected area on hand. Do not use for more than 2 weeks in a row. Do not use on face or skin folds. 30 g 0     Social History:   Social History     Tobacco Use    Smoking status: Former     Types: Cigarettes     Quit date:      Years since quittin.2    Smokeless tobacco: Never    Tobacco comments:     social smoking in 20s   Substance Use Topics    Alcohol use: Yes      Comment: wine, belén sometimes, casual 3-4 per week.       ROS:  General: negative  Skin: negative  Eyes: negative  Ears/Nose/Throat: negative  Respiratory: Cough  Cardiovascular: negative  Gastrointestinal: negative  Genitourinary: negative  Musculoskeletal: negative  Neurologic: negative      OBJECTIVE:  /84 (BP Location: Right arm, Patient Position: Chair, Cuff Size: Adult Regular)   Pulse 71   Temp 97.9  F (36.6  C) (Oral)   Resp 14   Wt 110.7 kg (244 lb)   LMP 11/01/2018 (Approximate)   SpO2 98%   BMI 46.85 kg/m    GENERAL APPEARANCE: healthy, alert and no distress  EYES: EOMI,  PERRL, conjunctiva clear  HENT: ear canals and TM's normal.  Nose and mouth without ulcers, erythema or lesions  NECK: supple, nontender, no lymphadenopathy  RESP: lungs clear to auscultation - no rales, rhonchi or wheezes  CV: regular rates and rhythm, normal S1 S2, no murmur noted  SKIN: no suspicious lesions or rashes    Labs:    Results for orders placed or performed in visit on 03/18/24   XR Chest 2 Views     Status: None    Narrative    EXAM: XR CHEST 2 VIEWS  LOCATION: [facility]  DATE: 3/18/2024    INDICATION:  Acute bronchitis, unspecified organism  COMPARISON: None.      Impression    IMPRESSION:     Cardiac silhouette is normal in size. There is a small hiatal hernia. The aortic contours are normal.    Symmetric lung inflation. No bronchial wall thickening. No airspace or interstitial lung opacities. No pleural effusion.       ASSESSMENT:    ICD-10-CM    1. Acute bronchitis, unspecified organism  J20.9 XR Chest 2 Views     benzonatate (TESSALON) 100 MG capsule     albuterol (PROAIR HFA/PROVENTIL HFA/VENTOLIN HFA) 108 (90 Base) MCG/ACT inhaler     predniSONE (DELTASONE) 20 MG tablet          PLAN:  Patient Instructions   Patient was educated on the natural course of condition. Chest x-ray is negative for pneumonia. Take medications as directed. Side effects discussed. Conservative measures  discussed including rest, increased fluids, humidifier, and over-the-counter cough suppressants. See your primary care provider if symptoms do not improve in 7 days. Seek emergency care if you develop shortness of breath or fever over 103.    Patient verbalized understanding and is agreeable to plan. The patient was discharged ambulatory and in stable condition.    Ana Krishnamurthy PA-C ....................  3/18/2024   5:47 PM

## 2024-03-18 NOTE — TELEPHONE ENCOUNTER
Nurse Triage SBAR    Is this a 2nd Level Triage? NO    Situation: Cough for 3 weeks- not improving- notes wheezing and shortness of breath  Consent: not needed    Background: Has been taking muicinex and cough drops and ibuprofen- minimal relief  No history of lung infections     Assessment:   cough for 3 weeks and rasping in her left lung shortness of breath and fatigue  No chest pain  No fever  No runny nose or sinus congestion  Not productive  No blood  Left lung feels more congested and sob on exertion   Denies heart or lung history  Notes wheezing at rest and laying   Denies additional symptoms    Protocol Recommended Disposition:       Recommendation: Advised to be seen in the clinic- reviewed additional care advice with patient and she verbalizes understanding. Assisted in connecting with scheduling- if no available appointments patient to be seen in .      Scheduling    Does the patient meet one of the following criteria for ADS visit consideration? 16+ years old, with an [facility] PCP     TIP  Providers, please consider if this condition is appropriate for management at one of our Acute and Diagnostic Services sites.     If patient is a good candidate, please use dotphrase <dot>triageresponse and select Refer to ADS to document.      Rebeca Banda RN 2:31 PM 3/18/2024  Reason for Disposition   MILD difficulty breathing (e.g., minimal/no SOB at rest, SOB with walking, pulse <100) and still present when not coughing    Additional Information   Negative: Bluish (or gray) lips or face   Negative: SEVERE difficulty breathing (e.g., struggling for each breath, speaks in single words)   Negative: Rapid onset of cough and has hives   Negative: Coughing started suddenly after medicine, an allergic food or bee sting   Negative: Difficulty breathing after exposure to flames, smoke, or fumes   Negative: Sounds like a life-threatening emergency to the triager   Negative: Previous asthma attacks and this feels like  asthma attack   Negative: Dry cough (non-productive; no sputum or minimal clear sputum) and within 14 days of COVID-19 Exposure   Negative: MODERATE difficulty breathing (e.g., speaks in phrases, SOB even at rest, pulse 100-120) and still present when not coughing   Negative: Chest pain present when not coughing   Negative: Passed out (i.e., fainted, collapsed and was not responding)   Negative: Patient sounds very sick or weak to the triager    Protocols used: Cough-A-OH

## 2024-05-02 ENCOUNTER — HOSPITAL ENCOUNTER (OUTPATIENT)
Dept: MAMMOGRAPHY | Facility: CLINIC | Age: 59
Discharge: HOME | End: 2024-05-02
Attending: PHYSICIAN ASSISTANT | Admitting: PHYSICIAN ASSISTANT
Payer: COMMERCIAL

## 2024-05-02 ENCOUNTER — MYC MEDICAL ADVICE (OUTPATIENT)
Dept: FAMILY MEDICINE | Facility: CLINIC | Age: 59
End: 2024-05-02
Payer: COMMERCIAL

## 2024-05-02 DIAGNOSIS — Z13.220 LIPID SCREENING: ICD-10-CM

## 2024-05-02 DIAGNOSIS — R73.09 ELEVATED GLUCOSE: Primary | ICD-10-CM

## 2024-05-02 DIAGNOSIS — Z12.31 VISIT FOR SCREENING MAMMOGRAM: ICD-10-CM

## 2024-05-02 PROCEDURE — 77063 BREAST TOMOSYNTHESIS BI: CPT

## 2024-05-14 ENCOUNTER — LAB (OUTPATIENT)
Dept: LAB | Facility: CLINIC | Age: 59
End: 2024-05-14
Payer: COMMERCIAL

## 2024-05-14 DIAGNOSIS — Z13.220 LIPID SCREENING: ICD-10-CM

## 2024-05-14 DIAGNOSIS — R73.09 ELEVATED GLUCOSE: ICD-10-CM

## 2024-05-14 LAB
ANION GAP SERPL CALCULATED.3IONS-SCNC: 11 MMOL/L (ref 7–15)
BUN SERPL-MCNC: 13.8 MG/DL (ref 6–20)
CALCIUM SERPL-MCNC: 9.6 MG/DL (ref 8.6–10)
CHLORIDE SERPL-SCNC: 103 MMOL/L (ref 98–107)
CHOLEST SERPL-MCNC: 197 MG/DL
CREAT SERPL-MCNC: 0.86 MG/DL (ref 0.51–0.95)
DEPRECATED HCO3 PLAS-SCNC: 24 MMOL/L (ref 22–29)
EGFRCR SERPLBLD CKD-EPI 2021: 78 ML/MIN/1.73M2
FASTING STATUS PATIENT QL REPORTED: YES
FASTING STATUS PATIENT QL REPORTED: YES
GLUCOSE SERPL-MCNC: 100 MG/DL (ref 70–99)
HBA1C MFR BLD: 5.4 % (ref 0–5.6)
HDLC SERPL-MCNC: 62 MG/DL
LDLC SERPL CALC-MCNC: 123 MG/DL
NONHDLC SERPL-MCNC: 135 MG/DL
POTASSIUM SERPL-SCNC: 4.7 MMOL/L (ref 3.4–5.3)
SODIUM SERPL-SCNC: 138 MMOL/L (ref 135–145)
TRIGL SERPL-MCNC: 61 MG/DL

## 2024-05-14 PROCEDURE — 36415 COLL VENOUS BLD VENIPUNCTURE: CPT

## 2024-05-14 PROCEDURE — 80048 BASIC METABOLIC PNL TOTAL CA: CPT

## 2024-05-14 PROCEDURE — 80061 LIPID PANEL: CPT

## 2024-05-14 PROCEDURE — 83036 HEMOGLOBIN GLYCOSYLATED A1C: CPT

## 2024-05-30 SDOH — HEALTH STABILITY: PHYSICAL HEALTH: ON AVERAGE, HOW MANY DAYS PER WEEK DO YOU ENGAGE IN MODERATE TO STRENUOUS EXERCISE (LIKE A BRISK WALK)?: 0 DAYS

## 2024-05-30 SDOH — HEALTH STABILITY: PHYSICAL HEALTH: ON AVERAGE, HOW MANY MINUTES DO YOU ENGAGE IN EXERCISE AT THIS LEVEL?: 30 MIN

## 2024-05-30 ASSESSMENT — SOCIAL DETERMINANTS OF HEALTH (SDOH): HOW OFTEN DO YOU GET TOGETHER WITH FRIENDS OR RELATIVES?: TWICE A WEEK

## 2024-06-04 ENCOUNTER — OFFICE VISIT (OUTPATIENT)
Dept: FAMILY MEDICINE | Facility: CLINIC | Age: 59
End: 2024-06-04
Payer: COMMERCIAL

## 2024-06-04 VITALS
OXYGEN SATURATION: 99 % | DIASTOLIC BLOOD PRESSURE: 80 MMHG | HEART RATE: 67 BPM | SYSTOLIC BLOOD PRESSURE: 134 MMHG | RESPIRATION RATE: 16 BRPM | WEIGHT: 245 LBS | BODY MASS INDEX: 48.1 KG/M2 | HEIGHT: 60 IN | TEMPERATURE: 97.4 F

## 2024-06-04 DIAGNOSIS — R03.0 ELEVATED BLOOD PRESSURE READING WITHOUT DIAGNOSIS OF HYPERTENSION: ICD-10-CM

## 2024-06-04 DIAGNOSIS — Z80.3 FAMILY HISTORY OF MALIGNANT NEOPLASM OF BREAST: ICD-10-CM

## 2024-06-04 DIAGNOSIS — E66.01 MORBID OBESITY WITH BMI OF 45.0-49.9, ADULT (H): ICD-10-CM

## 2024-06-04 DIAGNOSIS — Z80.0 FAMILY HISTORY OF COLON CANCER: ICD-10-CM

## 2024-06-04 DIAGNOSIS — Z00.00 ROUTINE GENERAL MEDICAL EXAMINATION AT A HEALTH CARE FACILITY: Primary | ICD-10-CM

## 2024-06-04 DIAGNOSIS — M54.41 CHRONIC RIGHT-SIDED LOW BACK PAIN WITH RIGHT-SIDED SCIATICA: ICD-10-CM

## 2024-06-04 DIAGNOSIS — G89.29 CHRONIC RIGHT-SIDED LOW BACK PAIN WITH RIGHT-SIDED SCIATICA: ICD-10-CM

## 2024-06-04 DIAGNOSIS — M43.16 SPONDYLOLISTHESIS OF LUMBAR REGION: ICD-10-CM

## 2024-06-04 PROCEDURE — 99396 PREV VISIT EST AGE 40-64: CPT | Performed by: PHYSICIAN ASSISTANT

## 2024-06-04 ASSESSMENT — PAIN SCALES - GENERAL: PAINLEVEL: NO PAIN (1)

## 2024-06-04 NOTE — PROGRESS NOTES
Preventive Care Visit  [facility] LISANDRO Shah PA-C, Family Medicine  Jun 4, 2024      Assessment & Plan     Routine general medical examination at a health care facility    Chronic right-sided low back pain with right-sided sciatica  July 2023 - started having right low back pain into right leg  Saw [place] [facility]  MRI showed:  Bilateral L5 pars defects  L5-S1 spondylolisthesis with moderate bilateral foraminal narrowing causing a right S1 radiculitis  She did PT for about 1 month but didn't find it helpful so stopped going  Still has pain and would like to get a second opinion - would like to see spine clinic through [facility]  No new leg numbness/tingling. No leg weakness, saddle anesthesia, bowel/bladder incontinence or retention.  - Spine  Referral; Future    Elevated blood pressure reading without diagnosis of hypertension  Mildly elevated but lower on recheck. She has BP cuff at home and will start monitoring BP, follow-up if elevated. Recommend focusing on healthy lifestyle changes.    Morbid obesity with BMI of 45.0-49.9, adult (H)  Weight loss is recommended - would help improve blood pressure, reduce risk of developing other chronic health conditions like diabetes, etc. We discussed healthy habits to assist with weight loss. We discussed the role of pharmacological agents in the treatment of obesity. We reviewed medication that may assist with weight loss, specifically GLP-1 medication such as wegovy or zepbound. Reviewed r/b/se. No personal or family history of medullary thyroid carcinoma (MTC) or Multiple Endocrine Neoplasia syndrome type 2 (MEN, type 2). No history of pancreatitis. At this time, she wants to continue with lifestyle but will consider medication and can follow-up if she would like to discuss further.    Family history of malignant neoplasm of breast  Family history of colon cancer  Colon cancer in brother and breast cancer in mom (diagnosed in  her early 50s). She says mom had recurrence of breast cancer in her ovaries. Previously referred for genetic counseling evaluation but she did not follow-up for visit. She thinks her brother did have some gene mutation that put him at higher risk of cancer but she does not know what it was. His kids were evaluated by genetic counseling but she does not know the outcome. She has thought about it and she knows that she also could have a gene mutation that puts her at higher cancer risk but she doesn't want to pursue genetic counseling. She prefers to just make sure she is up to date with recommended routine cancer screenings.          BMI  Estimated body mass index is 47.85 kg/m  as calculated from the following:    Height as of this encounter: 1.524 m (5').    Weight as of this encounter: 111.1 kg (245 lb).   Weight management plan: Discussed healthy diet and exercise guidelines    Counseling  Appropriate preventive services were discussed with this patient.  Checklist reviewing preventive services available has been given to the patient.      Carolyn Conner is a 58 year old, presenting for the following:  Physical        6/4/2024     2:02 PM   Additional Questions   Roomed by ADONIS HUMPHREY   Accompanied by SELF         6/4/2024     2:02 PM   Patient Reported Additional Medications   Patient reports taking the following new medications NA          HPI    July 2023 - started having right low back pain into right leg  Saw [facility]  MRI showed:  Bilateral L5 pars defects  L5-S1 spondylolisthesis with moderate bilateral foraminal narrowing causing a right S1 radiculitis  She did PT for about 1 month but didn't find it helpful so stopped going  Still has pain and would like to get a second opinion - would like to see spine clinic through [facility]  No new leg numbness/tingling. No leg weakness, saddle anesthesia, bowel/bladder incontinence or retention.    No h/o HTN but has had mildly elevated blood pressures on  and off over past few years  Has BP cuff at home but does not check    Brother had colon cancer  She has colonoscopies through [facility], every 5 years (YANI completed today)  Believes last colonoscopy 2022 5/30/2024   General Health   How would you rate your overall physical health? (!) FAIR   Feel stress (tense, anxious, or unable to sleep) Not at all         5/30/2024   Nutrition   Three or more servings of calcium each day? (!) NO   Diet: Regular (no restrictions)   How many servings of fruit and vegetables per day? (!) 2-3   How many sweetened beverages each day? 0-1         5/30/2024   Exercise   Days per week of moderate/strenous exercise 0 days   Average minutes spent exercising at this level 30 min   (!) EXERCISE CONCERN      5/30/2024   Social Factors   Frequency of gathering with friends or relatives Twice a week   Worry food won't last until get money to buy more No   Food not last or not have enough money for food? No   Do you have housing?  Yes   Are you worried about losing your housing? No   Lack of transportation? No   Unable to get utilities (heat,electricity)? No         5/30/2024   Fall Risk   Fallen 2 or more times in the past year? No   Trouble with walking or balance? No          5/30/2024   Dental   Dentist two times every year? (!) NO         5/30/2024   TB Screening   Were you born outside of the [place]? No           Today's PHQ-2 Score:       9/19/2022     2:05 PM   PHQ-2 ( 1999 [employer])   Q1: Little interest or pleasure in doing things 0   Q2: Feeling down, depressed or hopeless 0   PHQ-2 Score 0   Q1: Little interest or pleasure in doing things Not at all   Q2: Feeling down, depressed or hopeless Not at all   PHQ-2 Score 0         5/30/2024   Substance Use   Alcohol more than 3/day or more than 7/wk No   Do you use any other substances recreationally? No     Social History     Tobacco Use    Smoking status: Former     Current packs/day: 0.00     Types: Cigarettes     Quit date: 1990      Years since quittin.4    Smokeless tobacco: Never    Tobacco comments:     social smoking in 20s   Vaping Use    Vaping status: Never Used   Substance Use Topics    Alcohol use: Yes     Alcohol/week: 3.0 - 4.0 standard drinks of alcohol     Types: 3 - 4 Standard drinks or equivalent per week     Comment: wine, belén sometimes, casual 3-4 per week.    Drug use: No           2024   LAST FHS-7 RESULTS   1st degree relative breast or ovarian cancer Yes   Any relative bilateral breast cancer No   Any male have breast cancer No   Any ONE woman have BOTH breast AND ovarian cancer Yes   Any woman with breast cancer before 50yrs No   2 or more relatives with breast AND/OR ovarian cancer No   2 or more relatives with breast AND/OR bowel cancer No     Colon cancer in brother and breast cancer in mom (diagnosed in her early 50s). She says mom had recurrence of breast cancer in her ovaries. Previously referred for genetic counseling evaluation but she did not follow-up for visit. She thinks her brother did have some gene mutation that put him at higher risk of cancer but she does not know what it was. His kids were evaluated by genetic counseling but she does not know the outcome. She has thought about it and she knows that she also could have a gene mutation that puts her at higher cancer risk but she doesn't want to pursue genetic counseling. She prefers to just make sure she is up to date with recommended routine cancer screenings.         Mammogram Screening - Mammogram every 1-2 years updated in Health Maintenance based on mutual decision making          2024   One time HIV Screening   Previous HIV test? No         2024   STI Screening   New sexual partner(s) since last STI/HIV test? No     History of abnormal Pap smear: does pap through OB/GYN       ASCVD Risk   The 10-year ASCVD risk score (Ade MACHADO, et al., 2019) is: 2.4%    Values used to calculate the score:      Age: 58 years      Sex:  Female      Is Non- : No      Diabetic: No      Tobacco smoker: No      Systolic Blood Pressure: 134 mmHg      Is BP treated: No      HDL Cholesterol: 62 mg/dL      Total Cholesterol: 197 mg/dL         Reviewed and updated as needed this visit by Provider   Tobacco  Allergies  Meds  Problems  Med Hx  Surg Hx  Fam Hx            Past Medical History:   Diagnosis Date    Environmental allergies 2015     Past Surgical History:   Procedure Laterality Date    CARPAL TUNNEL RELEASE RT/LT Left 10/19/2020    Left carpal tunnel release, Dr. Sal Soriano, [facility]     SECTION      twice,  and     COLONOSCOPY      ENDOMETRIAL SAMPLING (BIOPSY)  2017    negative; done for [facility]    ENT SURGERY      wisdom tooth extraction    ORTHOPEDIC SURGERY      right foot    SOFT TISSUE SURGERY Right     carpel tunnel     Labs reviewed in EPIC  BP Readings from Last 3 Encounters:   24 134/80   24 126/84   10/12/22 (!) 148/88    Wt Readings from Last 3 Encounters:   24 111.1 kg (245 lb)   24 110.7 kg (244 lb)   10/12/22 108.4 kg (239 lb)                  Patient Active Problem List   Diagnosis    Environmental allergies    Morbid obesity with BMI of 45.0-49.9, adult (H)    Elevated blood pressure reading without diagnosis of hypertension    Chronic right-sided low back pain with right-sided sciatica    Spondylolisthesis of lumbar region    Family history of malignant neoplasm of breast - mom    Family history of colon cancer - brother     Past Surgical History:   Procedure Laterality Date    CARPAL TUNNEL RELEASE RT/LT Left 10/19/2020    Left carpal tunnel release, Dr. Sal Soriano, [facility]     SECTION      twice,  and     COLONOSCOPY      ENDOMETRIAL SAMPLING (BIOPSY)  2017    negative; done for [facility]    ENT SURGERY      wisdom tooth extraction    ORTHOPEDIC SURGERY      right foot    SOFT TISSUE SURGERY Right      carpel tunnel       Social History     Tobacco Use    Smoking status: Former     Current packs/day: 0.00     Types: Cigarettes     Quit date:      Years since quittin.4    Smokeless tobacco: Never    Tobacco comments:     social smoking in 20s   Substance Use Topics    Alcohol use: Yes     Alcohol/week: 3.0 - 4.0 standard drinks of alcohol     Types: 3 - 4 Standard drinks or equivalent per week     Comment: wine, belén sometimes, casual 3-4 per week.     Family History   Problem Relation Age of Onset    Diabetes Mother     Breast Cancer Mother         early 50's    Hypertension Mother     Ovarian Cancer Mother         was told breast cancer metastasized to ovaries    Heart Disease Father         MI    Hypertension Brother     Diabetes Brother     Hypertension Brother     Hyperlipidemia Brother     Kidney Disease Brother         had kidney transplant    Diabetes Brother     Hypertension Brother     Colon Cancer Brother         passed    Skin Cancer Brother     Thyroid Disease Daughter 19        Hypothyroidism    Thyroid Disease Daughter          Current Outpatient Medications   Medication Sig Dispense Refill    cetirizine (ZYRTEC) 10 MG tablet Take 1 tablet (10 mg) by mouth daily 90 tablet 3    fluticasone (FLONASE) 50 MCG/ACT nasal spray Spray 1 spray into both nostrils daily      Vitamin D, Cholecalciferol, 1000 units TABS Take 1 tablet by mouth daily       Allergies   Allergen Reactions    Codeine Sulfate Other (See Comments)     Hallucinations     Recent Labs   Lab Test 24  0914 22  1343 09/15/22  0923 09/15/20  0843 19  0825 17  0530 17  1037   A1C 5.4 5.5  --   --   --   --   --    *  --  105* 101* 138*   < >  --    HDL 62  --  78 93 74   < >  --    TRIG 61  --  69 54 56   < >  --    ALT  --   --   --  32 38  --  29   CR 0.86  --  0.72 0.76 0.77   < > 0.74   GFRESTIMATED 78  --  >90 89 88   < > 82   GFRESTBLACK  --   --   --  >90 >90   < > >90   GFR  Calc     POTASSIUM 4.7  --  4.7 4.7 4.3   < > 4.0   TSH  --   --   --  1.69 2.53  --   --     < > = values in this interval not displayed.          Review of Systems  Constitutional, HEENT, cardiovascular, pulmonary, gi and gu systems are negative, except as otherwise noted.     Objective    Exam  /80   Pulse 67   Temp 97.4  F (36.3  C) (Oral)   Resp 16   Ht 1.524 m (5')   Wt 111.1 kg (245 lb)   LMP 11/01/2018 (Approximate)   SpO2 99%   BMI 47.85 kg/m     Estimated body mass index is 47.85 kg/m  as calculated from the following:    Height as of this encounter: 1.524 m (5').    Weight as of this encounter: 111.1 kg (245 lb).    Physical Exam  GENERAL: alert and no distress  EYES: Eyes grossly normal to inspection, PERRL and conjunctivae and sclerae normal  HENT: ear canals and TM's normal, nose and mouth without ulcers or lesions  NECK: no adenopathy, no asymmetry, masses, or scars  RESP: lungs clear to auscultation - no rales, rhonchi or wheezes  CV: regular rate and rhythm, normal S1 S2, no S3 or S4, no murmur, click or rub, no peripheral edema  ABDOMEN: soft, nontender, no hepatosplenomegaly, no masses and bowel sounds normal  MS: no gross musculoskeletal defects noted, no edema  SKIN: no suspicious lesions or rashes  NEURO: Normal strength and tone, mentation intact and speech normal  PSYCH: mentation appears normal, affect normal/bright        Signed Electronically by: Destiny Shah PA-C

## 2024-06-04 NOTE — PATIENT INSTRUCTIONS
"Preventive Care Advice   This is general advice we often give to help people stay healthy. Your care team may have specific advice just for you. Please talk to your care team about your own preventive care needs.  Lifestyle  Exercise at least 150 minutes each week (30 minutes a day, 5 days a week).  Do muscle strengthening activities 2 days a week. These help control your weight and prevent disease.  No smoking.  Wear sunscreen to prevent skin cancer.  Have your home tested for radon every 2 to 5 years. Radon is a colorless, odorless gas that can harm your lungs. To learn more, go to www.health.[facility].mn. and search for \"Radon in Homes.\"  Keep guns unloaded and locked up in a safe place like a safe or gun vault, or, use a gun lock and hide the keys. Always lock away bullets separately. To learn more, visit [employer].gov and search for \"safe gun storage.\"  Nutrition  Eat 5 or more servings of fruits and vegetables each day.  Try wheat bread, brown rice and whole grain pasta (instead of white bread, rice, and pasta).  Get enough calcium and vitamin D. Check the label on foods and aim for 100% of the RDA (recommended daily allowance).  Regular exams  Have a dental exam and cleaning every 6 months.  See your health care team every year to talk about:  Any changes in your health.  Any medicines your care team has prescribed.  Preventive care, family planning, and ways to prevent chronic diseases.  Shots (vaccines)   HPV shots (up to age 26), if you've never had them before.  Hepatitis B shots (up to age 59), if you've never had them before.  COVID-19 shot: Get this shot when it's due.  Flu shot: Get a flu shot every year.  Tetanus shot: Get a tetanus shot every 10 years.  Pneumococcal, hepatitis A, and RSV shots: Ask your care team if you need these based on your risk.  Shingles shot (for age 50 and up).  General health tests  Diabetes screening:  Starting at age 35, Get screened for diabetes at least every 3 years.  If " you are younger than age 35, ask your care team if you should be screened for diabetes.  Cholesterol test: At age 39, start having a cholesterol test every 5 years, or more often if advised.  Bone density scan (DEXA): At age 50, ask your care team if you should have this scan for osteoporosis (brittle bones).  Hepatitis C: Get tested at least once in your life.  Abdominal aortic aneurysm screening: Talk to your doctor about having this screening if you:  Have ever smoked; and  Are biologically male; and  Are between the ages of 65 and 75.  STIs (sexually transmitted infections)  Before age 24: Ask your care team if you should be screened for STIs.  After age 24: Get screened for STIs if you're at risk. You are at risk for STIs (including HIV) if:  You are sexually active with more than one person.  You don't use condoms every time.  You or a partner was diagnosed with a sexually transmitted infection.  If you are at risk for HIV, ask about PrEP medicine to prevent HIV.  Get tested for HIV at least once in your life, whether you are at risk for HIV or not.  Cancer screening tests  Cervical cancer screening: If you have a cervix, begin getting regular cervical cancer screening tests at age 21. Most people who have regular screenings with normal results can stop after age 65. Talk about this with your provider.  Breast cancer scan (mammogram): If you've ever had breasts, begin having regular mammograms starting at age 40. This is a scan to check for breast cancer.  Colon cancer screening: It is important to start screening for colon cancer at age 45.  Have a colonoscopy test every 10 years (or more often if you're at risk) Or, ask your provider about stool tests like a FIT test every year or Cologuard test every 3 years.  To learn more about your testing options, visit: [email or web address].  For help making a decision, visit: [email or web address].  Prostate cancer screening test: If you have a prostate and are age 55  to 69, ask your provider if you would benefit from a yearly prostate cancer screening test.  Lung cancer screening: If you are a current or former smoker age 50 to 80, ask your care team if ongoing lung cancer screenings are right for you.  For informational purposes only. Not to replace the advice of your health care provider. Copyright   2023 [employer]. All rights reserved. Clinically reviewed by the [facility]. DEONTICS 626172 - REV 04/24.

## 2025-05-08 ENCOUNTER — PATIENT OUTREACH (OUTPATIENT)
Dept: CARE COORDINATION | Facility: CLINIC | Age: 60
End: 2025-05-08
Payer: COMMERCIAL

## 2025-05-22 ENCOUNTER — PATIENT OUTREACH (OUTPATIENT)
Dept: CARE COORDINATION | Facility: CLINIC | Age: 60
End: 2025-05-22
Payer: COMMERCIAL

## 2025-07-05 ENCOUNTER — HEALTH MAINTENANCE LETTER (OUTPATIENT)
Age: 60
End: 2025-07-05

## (undated) RX ORDER — FENTANYL CITRATE 50 UG/ML
INJECTION, SOLUTION INTRAMUSCULAR; INTRAVENOUS
Status: DISPENSED
Start: 2017-04-19

## (undated) RX ORDER — HEPARIN SODIUM 1000 [USP'U]/ML
INJECTION, SOLUTION INTRAVENOUS; SUBCUTANEOUS
Status: DISPENSED
Start: 2017-04-19